# Patient Record
Sex: MALE | Race: BLACK OR AFRICAN AMERICAN | Employment: PART TIME | URBAN - METROPOLITAN AREA
[De-identification: names, ages, dates, MRNs, and addresses within clinical notes are randomized per-mention and may not be internally consistent; named-entity substitution may affect disease eponyms.]

---

## 2017-03-23 ENCOUNTER — OFFICE VISIT (OUTPATIENT)
Dept: ORTHOPEDIC SURGERY | Age: 47
End: 2017-03-23

## 2017-03-23 VITALS
BODY MASS INDEX: 42.66 KG/M2 | HEIGHT: 72 IN | HEART RATE: 98 BPM | SYSTOLIC BLOOD PRESSURE: 138 MMHG | WEIGHT: 315 LBS | DIASTOLIC BLOOD PRESSURE: 94 MMHG

## 2017-03-23 DIAGNOSIS — M17.11 PATELLOFEMORAL ARTHRITIS OF RIGHT KNEE: ICD-10-CM

## 2017-03-23 DIAGNOSIS — M25.561 RIGHT KNEE PAIN, UNSPECIFIED CHRONICITY: ICD-10-CM

## 2017-03-23 DIAGNOSIS — M22.2X2 PATELLOFEMORAL SYNDROME OF LEFT KNEE: Primary | ICD-10-CM

## 2017-03-23 DIAGNOSIS — M25.462 KNEE EFFUSION, LEFT: ICD-10-CM

## 2017-03-23 PROCEDURE — 20610 DRAIN/INJ JOINT/BURSA W/O US: CPT | Performed by: ORTHOPAEDIC SURGERY

## 2017-03-23 PROCEDURE — 99214 OFFICE O/P EST MOD 30 MIN: CPT | Performed by: ORTHOPAEDIC SURGERY

## 2017-03-23 RX ORDER — AZELASTINE 1 MG/ML
1 SPRAY, METERED NASAL 2 TIMES DAILY
COMMUNITY

## 2017-06-08 ENCOUNTER — OFFICE VISIT (OUTPATIENT)
Dept: ORTHOPEDIC SURGERY | Age: 47
End: 2017-06-08

## 2017-06-08 VITALS
BODY MASS INDEX: 42.66 KG/M2 | DIASTOLIC BLOOD PRESSURE: 96 MMHG | SYSTOLIC BLOOD PRESSURE: 138 MMHG | HEIGHT: 72 IN | WEIGHT: 315 LBS | HEART RATE: 86 BPM

## 2017-06-08 DIAGNOSIS — M22.2X2 PATELLOFEMORAL SYNDROME OF LEFT KNEE: Primary | ICD-10-CM

## 2017-06-08 PROCEDURE — 20610 DRAIN/INJ JOINT/BURSA W/O US: CPT | Performed by: ORTHOPAEDIC SURGERY

## 2017-06-08 PROCEDURE — 99213 OFFICE O/P EST LOW 20 MIN: CPT | Performed by: ORTHOPAEDIC SURGERY

## 2017-06-16 ENCOUNTER — TELEPHONE (OUTPATIENT)
Dept: BARIATRICS/WEIGHT MGMT | Age: 47
End: 2017-06-16

## 2017-08-17 ENCOUNTER — TELEPHONE (OUTPATIENT)
Dept: BARIATRICS/WEIGHT MGMT | Age: 47
End: 2017-08-17

## 2017-08-31 ENCOUNTER — OFFICE VISIT (OUTPATIENT)
Dept: BARIATRICS/WEIGHT MGMT | Age: 47
End: 2017-08-31

## 2017-08-31 VITALS
HEART RATE: 98 BPM | HEIGHT: 72 IN | DIASTOLIC BLOOD PRESSURE: 88 MMHG | WEIGHT: 315 LBS | SYSTOLIC BLOOD PRESSURE: 137 MMHG | RESPIRATION RATE: 16 BRPM | BODY MASS INDEX: 42.66 KG/M2

## 2017-08-31 DIAGNOSIS — M17.11 PATELLOFEMORAL ARTHRITIS OF RIGHT KNEE: ICD-10-CM

## 2017-08-31 DIAGNOSIS — Z01.818 PRE-OPERATIVE CLEARANCE: ICD-10-CM

## 2017-08-31 DIAGNOSIS — G47.33 OBSTRUCTIVE SLEEP APNEA: ICD-10-CM

## 2017-08-31 DIAGNOSIS — E66.01 MORBID OBESITY WITH BMI OF 40.0-44.9, ADULT (HCC): Primary | ICD-10-CM

## 2017-08-31 PROCEDURE — 99205 OFFICE O/P NEW HI 60 MIN: CPT | Performed by: SURGERY

## 2017-10-10 ENCOUNTER — OFFICE VISIT (OUTPATIENT)
Dept: ORTHOPEDIC SURGERY | Age: 47
End: 2017-10-10

## 2017-10-10 VITALS
DIASTOLIC BLOOD PRESSURE: 92 MMHG | BODY MASS INDEX: 42.66 KG/M2 | WEIGHT: 315 LBS | HEART RATE: 81 BPM | HEIGHT: 72 IN | SYSTOLIC BLOOD PRESSURE: 137 MMHG

## 2017-10-10 DIAGNOSIS — M22.2X2 PATELLOFEMORAL SYNDROME OF LEFT KNEE: Primary | ICD-10-CM

## 2017-10-10 PROCEDURE — 99213 OFFICE O/P EST LOW 20 MIN: CPT | Performed by: ORTHOPAEDIC SURGERY

## 2017-10-10 PROCEDURE — 20610 DRAIN/INJ JOINT/BURSA W/O US: CPT | Performed by: ORTHOPAEDIC SURGERY

## 2017-10-10 NOTE — PROGRESS NOTES
Review of Systems   Musculoskeletal: Positive for joint pain. Psychiatric/Behavioral: Positive for depression. All other systems reviewed and are negative.

## 2017-10-10 NOTE — PROGRESS NOTES
Chief Complaint    Follow-up (left knee pain)      History of Present Illness:  Remigio Orr is a 41 y.o. male  Pain Assessment  Location of Pain: Knee  Location Modifiers: Left  Severity of Pain: 2  Quality of Pain: Dull  Duration of Pain: A few days  Aggravating Factors: Bending  Relieving Factors: Rest  Result of Injury: No  Work-Related Injury: No  Are there other pain locations you wish to document?: No     Patient is being seen for a follow up visit for persistent left knee pain and swelling. Patient has been prescribed conservative treatment measures for left knee patellofemoral syndrome, which has consisted of multiple knee injections and aspirations, weight loss management. Patient has recently enrolled in [facility]'s bariatric weight loss program and his weight is currently at 330#. Patient last had his left knee aspirated and cortisone injection administered 6/8/17 and recalls receiving excellent relief of symptoms. Patient has noticed a return of his pre-injection symptoms, with anterior knee pain and stiffness and noticed a return of swelling about his left knee over the past several weeks. Medical History:  Patient's medications, allergies, past medical, surgical, social and family histories were reviewed and updated as appropriate. Allergies   Allergen Reactions    Other Other (See Comments)     cats     Current Outpatient Prescriptions on File Prior to Visit   Medication Sig Dispense Refill    naproxen-diphenhydramine 220-25 MG TABS Take by mouth      azelastine (ASTELIN) 0.1 % nasal spray 1 spray by Nasal route 2 times daily Use in each nostril as directed      Fluticasone Propionate (FLONASE ALLERGY RELIEF NA) by Nasal route       No current facility-administered medications on file prior to visit.             Review of Systems:  Relevant review of systems reviewed and available in the patient's chart    Vital Signs:  Vitals:    10/10/17 1026   BP: (!) 137/92   Pulse: 81 Physical Exam ______  Constitutional:  oriented to person, place, and time,  appears well-developed and well-nourished. ________________________________  Musculoskeletal:        Right hip: exhibits normal range of motion, normal strength, no tenderness, no bony tenderness, no swelling, no crepitus and no deformity. Left hip:  exhibits normal range of motion, normal strength, no tenderness, no swelling, no crepitus and no deformity. Right ankle: exhibits normal range of motion, no swelling, no ecchymosis and no deformity. No tenderness. Left ankle:  exhibits normal range of motion, no swelling, no ecchymosis and no deformity. No tenderness. ________  Neurological:  is alert and oriented to person, place, and time. She has normal strength and normal reflexes. She displays a negative Romberg sign. Gait normal. ____  Skin: No ecchymosis, no laceration, no lesion and no rash noted. No erythema. ____  Psychiatric:  has a normal mood and affect. Her speech is normal. Cognition and memory are normal. __    Knee Examination: Left Knee    Inspection:  Moderate joint effusion    Palpation:  No joint line tenderness, no crepitus    Range of Motion:  5 - 125 with some discomfort    Strength:  Good    Skin: There are no rashes, ulcerations or lesions. Impression:  Encounter Diagnosis   Name Primary?  Patellofemoral syndrome of left knee Yes       Treatment Plan: The patient was not scheduled for a cortisone injection, but after examination and evaluation. Following treatment plan is offered. Patient should continue to follow conservative treatment measures for left knee PFP syndrome, which consists of a repeat aspiration and cortisone injection. Patient was in agreement with this recommendation and wanted to proceed with the procedure. After informed consent was provided, the patient lay supine on the exam table. The superolateral aspect of the left knee was prepped with Chlora-prep.   The skin and subcutaneous tissues were anesthetized with ethyl chloride spray and 1% lidocaine injected with a 20-gauge needle. The needle was withdrawn. Through the same tract, an 18-gauge needle attached to a 50-mL syringe was inserted into the left knee. Then, 45 mL of yellowish, normal appearing fluid was aspirated. The syringe was removed and through the needle,  2 ml of 40mg/ml DepoMedrol was injected. The needle was withdrawn and the puncture site sealed with a Band-Aid. The patient tolerated the procedure well. Patient should follow through on his consultation and preparations with bariatric surgeons       Follow up: PRN    This dictation was performed with a verbal recognition program (DRAGON) and it was checked for errors. It is possible that there are still dictated errors within this office note. If so, please bring any errors to my attention for an addendum. All efforts were made to ensure that this office note is accurate. Supervising Physician Attestation:  I, Dr. Elton Sanders, personally performed the services described in this documentation as scribed above, and it is both accurate and complete and I agree with all pertinent clinical information. I personally interviewed the patient and performed a physical examination and medical decision making. I discussed the patient's condition and treatment options and have  reviewed and agree with the past medical, family and social history unless otherwise noted. All of the patient's questions were answered.       Board Certified Orthopaedic Surgeon  [address] [facility] and [facility] and [address] and [employer]  Professor of [facility] [address], [place], ATC, am scribing for and in the presence of Dr. Sylvia Cook     10:55 AM    Nydia Cevallos MS, ATC

## 2017-10-12 ENCOUNTER — OFFICE VISIT (OUTPATIENT)
Dept: BARIATRICS/WEIGHT MGMT | Age: 47
End: 2017-10-12

## 2017-10-12 VITALS
HEIGHT: 72 IN | SYSTOLIC BLOOD PRESSURE: 140 MMHG | HEART RATE: 78 BPM | DIASTOLIC BLOOD PRESSURE: 90 MMHG | BODY MASS INDEX: 42.66 KG/M2 | WEIGHT: 315 LBS

## 2017-10-12 DIAGNOSIS — G47.33 OBSTRUCTIVE SLEEP APNEA: ICD-10-CM

## 2017-10-12 DIAGNOSIS — M17.11 PATELLOFEMORAL ARTHRITIS OF RIGHT KNEE: ICD-10-CM

## 2017-10-12 DIAGNOSIS — E66.01 MORBID OBESITY WITH BMI OF 40.0-44.9, ADULT (HCC): ICD-10-CM

## 2017-10-12 DIAGNOSIS — Z01.818 PRE-OPERATIVE CLEARANCE: ICD-10-CM

## 2017-10-12 DIAGNOSIS — E66.01 MORBID OBESITY WITH BMI OF 45.0-49.9, ADULT (HCC): Primary | ICD-10-CM

## 2017-10-12 LAB
A/G RATIO: 1.3 (ref 1.1–2.2)
ALBUMIN SERPL-MCNC: 3.9 G/DL (ref 3.4–5)
ALP BLD-CCNC: 85 U/L (ref 40–129)
ALT SERPL-CCNC: 39 U/L (ref 10–40)
ANION GAP SERPL CALCULATED.3IONS-SCNC: 13 MMOL/L (ref 3–16)
AST SERPL-CCNC: 23 U/L (ref 15–37)
BANDED NEUTROPHILS RELATIVE PERCENT: 1 % (ref 0–7)
BASOPHILS ABSOLUTE: 0 K/UL (ref 0–0.2)
BASOPHILS RELATIVE PERCENT: 0 %
BILIRUB SERPL-MCNC: <0.2 MG/DL (ref 0–1)
BUN BLDV-MCNC: 13 MG/DL (ref 7–20)
CALCIUM SERPL-MCNC: 9.1 MG/DL (ref 8.3–10.6)
CHLORIDE BLD-SCNC: 102 MMOL/L (ref 99–110)
CHOLESTEROL, TOTAL: 195 MG/DL (ref 0–199)
CO2: 26 MMOL/L (ref 21–32)
CREAT SERPL-MCNC: 1 MG/DL (ref 0.9–1.3)
EOSINOPHILS ABSOLUTE: 0 K/UL (ref 0–0.6)
EOSINOPHILS RELATIVE PERCENT: 0 %
FOLATE: 8.95 NG/ML (ref 4.78–24.2)
GFR AFRICAN AMERICAN: >60
GFR NON-AFRICAN AMERICAN: >60
GLOBULIN: 3 G/DL
GLUCOSE BLD-MCNC: 88 MG/DL (ref 70–99)
HCT VFR BLD CALC: 47 % (ref 40.5–52.5)
HDLC SERPL-MCNC: 59 MG/DL (ref 40–60)
HEMOGLOBIN: 15.8 G/DL (ref 13.5–17.5)
IRON SATURATION: 17 % (ref 20–50)
IRON: 56 UG/DL (ref 59–158)
LDL CHOLESTEROL CALCULATED: 114 MG/DL
LYMPHOCYTES ABSOLUTE: 3.1 K/UL (ref 1–5.1)
LYMPHOCYTES RELATIVE PERCENT: 29 %
MCH RBC QN AUTO: 32.5 PG (ref 26–34)
MCHC RBC AUTO-ENTMCNC: 33.5 G/DL (ref 31–36)
MCV RBC AUTO: 97 FL (ref 80–100)
MONOCYTES ABSOLUTE: 0.6 K/UL (ref 0–1.3)
MONOCYTES RELATIVE PERCENT: 6 %
NEUTROPHILS ABSOLUTE: 7 K/UL (ref 1.7–7.7)
NEUTROPHILS RELATIVE PERCENT: 64 %
PDW BLD-RTO: 14.7 % (ref 12.4–15.4)
PLATELET # BLD: 203 K/UL (ref 135–450)
PMV BLD AUTO: 9.9 FL (ref 5–10.5)
POTASSIUM SERPL-SCNC: 4.6 MMOL/L (ref 3.5–5.1)
RBC # BLD: 4.85 M/UL (ref 4.2–5.9)
SLIDE REVIEW: NORMAL
SODIUM BLD-SCNC: 141 MMOL/L (ref 136–145)
TOTAL IRON BINDING CAPACITY: 326 UG/DL (ref 260–445)
TOTAL PROTEIN: 6.9 G/DL (ref 6.4–8.2)
TRIGL SERPL-MCNC: 108 MG/DL (ref 0–150)
TSH REFLEX: 1.53 UIU/ML (ref 0.27–4.2)
VITAMIN B-12: 714 PG/ML (ref 211–911)
VITAMIN D 25-HYDROXY: 25.7 NG/ML
VLDLC SERPL CALC-MCNC: 22 MG/DL
WBC # BLD: 10.8 K/UL (ref 4–11)

## 2017-10-12 PROCEDURE — 99213 OFFICE O/P EST LOW 20 MIN: CPT | Performed by: NURSE PRACTITIONER

## 2017-10-12 ASSESSMENT — ENCOUNTER SYMPTOMS
RESPIRATORY NEGATIVE: 1
GASTROINTESTINAL NEGATIVE: 1

## 2017-10-12 NOTE — PROGRESS NOTES
Maryana Camachoy gained 1 lbs over past . Reports he has been under a lot of stress. He is eating 1- 2 x day. He takes care of his mom 4-5 x week and reports he will house sit in the next 2 weeks. Breakfast: coffee     Snack: 6 crackers with cheese and almonds     Lunch:     Snack: (2:00) if at home- protein (chicken with salad) or out (burger) or 6 crackers with cheese and almonds     Dinner:     Snack: (10:00) - sandwich with veggies (greens/tomatoes) with protein/cheese     Reports he has tried to reduce late night cravings     Fluids: coffee (caffeinated) with baileys (non alcoholic creamer), water, occasionally has red bull/energy drinks, kombucha     Is pt consuming smaller portions? yes    Is pt consuming at least 64 oz of fluids per day? yes    Is pt consuming carbonated, caffeinated, or sugary beverages? yes    Has pt sampled Unjury and/or Nectar protein?  Not yet     Exercise: needs to have knee replacement- has arthritis in his L knee- hopes this will improve     Plan/Recommendations: Eat 3 x day, meal plan, switch to decaf, avoid red bull/energy drinks     Handouts: none     Tayler Avila

## 2017-10-12 NOTE — PROGRESS NOTES
[address]  Physicians   [facility]    Subjective:      Patient ID: Mely Scale is a 41 y.o. male    HPI    Presurgery    [place] Scale is a very pleasant 41 y.o. male with Body mass index is 45 kg/m². Lorenso Frankel Past Medical History:   Diagnosis Date    Arthritis      Past Surgical History:   Procedure Laterality Date    APPENDECTOMY       Family History   Problem Relation Age of Onset    Arthritis Mother     Other Mother      MS    Cancer Father      pancreas    Cancer Maternal Grandmother      colon     Arthritis Maternal Grandmother      Social History   Substance Use Topics    Smoking status: Current Every Day Smoker     Packs/day: 1.00     Years: 15.00    Smokeless tobacco: Never Used      Comment: 1 pack cigs every 2 days    Alcohol use Yes      Comment: 1 bottle wine a week     I counseled the patient on the importance of not smoking and risks of ETOH. Allergies   Allergen Reactions    Other Other (See Comments)     cats     Vitals:    10/12/17 1305   BP: (!) 142/96   Pulse: 77   Weight: (!) 331 lb 12.8 oz (150.5 kg)   Height: 6' (1.829 m)        Body mass index is 45 kg/m². Current Outpatient Prescriptions:     naproxen-diphenhydramine 220-25 MG TABS, Take by mouth, Disp: , Rfl:     azelastine (ASTELIN) 0.1 % nasal spray, 1 spray by Nasal route 2 times daily Use in each nostril as directed, Disp: , Rfl:     Fluticasone Propionate (FLONASE ALLERGY RELIEF NA), by Nasal route, Disp: , Rfl:       Review of Systems   Constitutional: Negative. HENT: Negative. Respiratory: Negative. Cardiovascular: Negative. Gastrointestinal: Negative. Endocrine: Negative. Genitourinary: Negative. Musculoskeletal:        Knee pain   Skin: Negative. Neurological: Negative. Psychiatric/Behavioral: Negative. Objective:   Physical Exam   Constitutional: He is oriented to person, place, and time. He appears well-developed and well-nourished.    HENT:   Head: Normocephalic and atraumatic. Cardiovascular: Normal rate. Pulmonary/Chest: Effort normal.   Neurological: He is alert and oriented to person, place, and time. Skin: Skin is warm and dry. Psychiatric: He has a normal mood and affect. His behavior is normal. Judgment and thought content normal.         Assessment and Plan:       Patient is here for their 2nd  presurgery visit for sleeve, gained 1 lbs. The patient's current Body mass index is 45 kg/m². (10/12/17). He  is making some dietary and behavior modifications. He will work on eating 3x/day and his liquid calories. He did meet with the registered dietitian for continued follow up. I agree with recommendations and plan. He is not exercising due to knee pain, encouraged physical activity as he is able. Discussed preop work up which he is working on. We discussed working on smoking cessation cessation, marijuana cessation and avoiding alcohol. We will see him back in 1 month for continued follow up. I have spent 15 min counseling patient.

## 2017-10-13 ENCOUNTER — TELEPHONE (OUTPATIENT)
Dept: BARIATRICS/WEIGHT MGMT | Age: 47
End: 2017-10-13

## 2017-10-13 DIAGNOSIS — R73.03 PREDIABETES: ICD-10-CM

## 2017-10-13 DIAGNOSIS — E61.1 IRON DEFICIENCY: ICD-10-CM

## 2017-10-13 DIAGNOSIS — E78.2 MIXED HYPERLIPIDEMIA: Primary | ICD-10-CM

## 2017-10-13 DIAGNOSIS — E55.9 VITAMIN D DEFICIENCY: ICD-10-CM

## 2017-10-13 LAB
ESTIMATED AVERAGE GLUCOSE: 122.6 MG/DL
HBA1C MFR BLD: 5.9 %

## 2017-10-13 RX ORDER — FERROUS SULFATE 325(65) MG
325 TABLET ORAL
Qty: 90 TABLET | Refills: 0 | Status: SHIPPED | OUTPATIENT
Start: 2017-10-13 | End: 2019-04-17 | Stop reason: ALTCHOICE

## 2017-10-13 RX ORDER — CHOLECALCIFEROL (VITAMIN D3) 50 MCG
2000 TABLET ORAL DAILY
Qty: 90 TABLET | Refills: 0 | Status: SHIPPED | OUTPATIENT
Start: 2017-10-13 | End: 2020-02-11 | Stop reason: CLARIF

## 2017-11-16 ENCOUNTER — OFFICE VISIT (OUTPATIENT)
Dept: BARIATRICS/WEIGHT MGMT | Age: 47
End: 2017-11-16

## 2017-11-16 VITALS
DIASTOLIC BLOOD PRESSURE: 87 MMHG | HEART RATE: 96 BPM | WEIGHT: 315 LBS | SYSTOLIC BLOOD PRESSURE: 136 MMHG | HEIGHT: 72 IN | BODY MASS INDEX: 42.66 KG/M2

## 2017-11-16 DIAGNOSIS — G47.33 OBSTRUCTIVE SLEEP APNEA: ICD-10-CM

## 2017-11-16 DIAGNOSIS — E55.9 VITAMIN D DEFICIENCY: ICD-10-CM

## 2017-11-16 DIAGNOSIS — E66.01 MORBID OBESITY WITH BMI OF 40.0-44.9, ADULT (HCC): Primary | ICD-10-CM

## 2017-11-16 DIAGNOSIS — R73.03 PREDIABETES: ICD-10-CM

## 2017-11-16 DIAGNOSIS — E61.1 IRON DEFICIENCY: ICD-10-CM

## 2017-11-16 DIAGNOSIS — E78.2 MIXED HYPERLIPIDEMIA: ICD-10-CM

## 2017-11-16 PROCEDURE — 4004F PT TOBACCO SCREEN RCVD TLK: CPT | Performed by: NURSE PRACTITIONER

## 2017-11-16 PROCEDURE — G8484 FLU IMMUNIZE NO ADMIN: HCPCS | Performed by: NURSE PRACTITIONER

## 2017-11-16 PROCEDURE — 99213 OFFICE O/P EST LOW 20 MIN: CPT | Performed by: NURSE PRACTITIONER

## 2017-11-16 PROCEDURE — G8417 CALC BMI ABV UP PARAM F/U: HCPCS | Performed by: NURSE PRACTITIONER

## 2017-11-16 PROCEDURE — G8427 DOCREV CUR MEDS BY ELIG CLIN: HCPCS | Performed by: NURSE PRACTITIONER

## 2017-11-16 ASSESSMENT — ENCOUNTER SYMPTOMS
RESPIRATORY NEGATIVE: 1
GASTROINTESTINAL NEGATIVE: 1

## 2017-11-16 NOTE — PROGRESS NOTES
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 Reports he has had some cheat days. Reports he has been dealing with a lot of stress taking care of his mother with MS more. Stated he had meal prepped some days. Breakfast: decaf coffee- not hungry in the morning     Snack: no     Lunch: (2 p.m.) salmon with soy sauce, or quarter pounder with fries and medium diet drink     Snack: no    Dinner: (10) fast food- burger/fries with diet coke, or cucumber rolls with salmon     Snack: no    Fluids: Water with juice, now only drinking decaf coffee, no longer drinking red bull/energy drinks, bolthouse juice, some diet coke     Is pt consuming smaller portions? this varies    Is pt consuming at least 64 oz of fluids per day? yes    Is pt consuming carbonated, caffeinated, or sugary beverages? yes    Has pt sampled Unjury and/or Nectar protein? Not yet     Exercise: not as much d/t taking care of his mother.      Plan/Recommendations: eliminate diet coke, choose healthier food options, eat at least 3-4 x day, avoid juice     Handouts: none     Claude Carrizales

## 2017-11-16 NOTE — PATIENT INSTRUCTIONS
Patient received dietary handouts and education. Goals in preparing for bariatric surgery    You should be giving up all beverages that have carbonation, sugar, and caffeine. (Refer to the approved liquids list provided at initial visit)   You should be drinking 64 ounces of calorie free fluids per day. Suggestions include:  o Water (you may add fresh lemon or lime)  o Crystal Light  o [place] Liquid Water Enhancer  o Propel Zero  o Powerade Zero  o Isopure  o Pzogn1K  o SOBE Lifewater Zero  o Vitamin Water Zero  o Sugar Free Darshan-Aid      You should be eating 4-6 times per day.  Three small meals plus 1-2 snacks per day is your goal. This balances your calories and nutrients evenly throughout the day and helps to boost your metabolism. Snacking is a good thing if you are choosing healthful options. Refer to the snack list provided at your initial visit. Aim for a protein at every snack, plus a fruit, vegetable or starch. You should be eating protein at every meal and snack.  Protein is typically found in animal sources, i.e. chicken, beef, pork, fish and seafood, eggs. It is also found in low-fat dairy sources such as skim or 1% milk, low-fat yogurt, low-fat cheese, and low-fat cottage cheese. Plant based sources of protein include peanut butter, beans, and soy. You should be utilizing the 9-inch plate method.  Eating on a smaller plate will help you control portion size. But what you put on your plate counts also. Make ¼ of your plate protein, ¼ starch and ½ the plate non-starchy vegetables. You should be eliminating caffeine.  Caffeine is dehydrating. After surgery, its very important to stay hydrated. Giving up caffeine before surgery will help you focus on the changes necessary to be successful after surgery. There are many decaffeinated coffee and tea products available in grocery stores. You should be reducing added fat and sugar in your diet.    Frying foods adds too much fat and calories. Baking, broiling, or grilling meats add flavor without unhealthy fats. Using cooking oil spray and spray butter products are also healthful changes that will aid in your weight loss. Foods high in sugar are often also high in calories and low in nutrients. Eating habits after surgery will have to be a permanent and long-term change. Eating habits are so ingrained that it can be difficult to change. It is important to practice new eating habits prior to surgery to mentally prepare yourself for the challenge ahead. Also remember that overall health, age, and genetics make each persons weight loss progress different. Do not compare your progress (pre or post-operatively), the amount you eat, or exercise to other patients.

## 2017-11-16 NOTE — PROGRESS NOTES
[facility]) Physicians   [facility]    Subjective:      Patient ID: Destiney Atwood is a 41 y.o. male    HPI    Presurgery    Destiney Atwood is a very pleasant 41 y.o. male with Body mass index is 44.62 kg/m². Livier Bowen Past Medical History:   Diagnosis Date    Arthritis      Past Surgical History:   Procedure Laterality Date    APPENDECTOMY       Family History   Problem Relation Age of Onset    Arthritis Mother     Other Mother      MS    Cancer Father      pancreas    Cancer Maternal Grandmother      colon     Arthritis Maternal Grandmother      Social History   Substance Use Topics    Smoking status: Current Every Day Smoker     Packs/day: 1.00     Years: 15.00    Smokeless tobacco: Never Used    Alcohol use Yes      Comment: 1 bottle wine a week     I counseled the patient on the importance of not smoking and risks of ETOH. Allergies   Allergen Reactions    Other Other (See Comments)     cats     Vitals:    11/16/17 1158   BP: 136/87   Pulse: 96   Weight: (!) 329 lb (149.2 kg)   Height: 6' (1.829 m)        Body mass index is 44.62 kg/m². Current Outpatient Prescriptions:     ferrous sulfate 325 (65 Fe) MG tablet, Take 1 tablet by mouth Daily with supper, Disp: 90 tablet, Rfl: 0    Cholecalciferol (VITAMIN D) 2000 units TABS tablet, Take 1 tablet by mouth daily, Disp: 90 tablet, Rfl: 0    naproxen-diphenhydramine 220-25 MG TABS, Take by mouth, Disp: , Rfl:     azelastine (ASTELIN) 0.1 % nasal spray, 1 spray by Nasal route 2 times daily Use in each nostril as directed, Disp: , Rfl:     Fluticasone Propionate (FLONASE ALLERGY RELIEF NA), by Nasal route, Disp: , Rfl:       Review of Systems   Constitutional: Negative. HENT: Negative. Respiratory: Negative. Cardiovascular: Negative. Gastrointestinal: Negative. Endocrine: Negative. Genitourinary: Negative. Skin: Negative. Neurological: Negative. Psychiatric/Behavioral: Negative.           Objective:

## 2018-01-15 ENCOUNTER — OFFICE VISIT (OUTPATIENT)
Dept: BARIATRICS/WEIGHT MGMT | Age: 48
End: 2018-01-15

## 2018-01-15 VITALS
WEIGHT: 315 LBS | DIASTOLIC BLOOD PRESSURE: 88 MMHG | BODY MASS INDEX: 42.66 KG/M2 | HEART RATE: 76 BPM | HEIGHT: 72 IN | SYSTOLIC BLOOD PRESSURE: 142 MMHG

## 2018-01-15 DIAGNOSIS — R03.0 ELEVATED BP WITHOUT DIAGNOSIS OF HYPERTENSION: ICD-10-CM

## 2018-01-15 DIAGNOSIS — Z72.0 TOBACCO USE: ICD-10-CM

## 2018-01-15 DIAGNOSIS — E66.01 MORBID OBESITY WITH BMI OF 40.0-44.9, ADULT (HCC): Primary | ICD-10-CM

## 2018-01-15 DIAGNOSIS — Z79.899 HIGH RISK MEDICATIONS (NOT ANTICOAGULANTS) LONG-TERM USE: ICD-10-CM

## 2018-01-15 DIAGNOSIS — R73.03 PREDIABETES: ICD-10-CM

## 2018-01-15 PROCEDURE — 99215 OFFICE O/P EST HI 40 MIN: CPT | Performed by: FAMILY MEDICINE

## 2018-01-15 PROCEDURE — G8484 FLU IMMUNIZE NO ADMIN: HCPCS | Performed by: FAMILY MEDICINE

## 2018-01-15 PROCEDURE — G8427 DOCREV CUR MEDS BY ELIG CLIN: HCPCS | Performed by: FAMILY MEDICINE

## 2018-01-15 PROCEDURE — G8417 CALC BMI ABV UP PARAM F/U: HCPCS | Performed by: FAMILY MEDICINE

## 2018-01-15 PROCEDURE — 93000 ELECTROCARDIOGRAM COMPLETE: CPT | Performed by: FAMILY MEDICINE

## 2018-01-15 PROCEDURE — 4004F PT TOBACCO SCREEN RCVD TLK: CPT | Performed by: FAMILY MEDICINE

## 2018-01-15 ASSESSMENT — ENCOUNTER SYMPTOMS
EYES NEGATIVE: 1
RESPIRATORY NEGATIVE: 1
GASTROINTESTINAL NEGATIVE: 1

## 2018-01-15 ASSESSMENT — PATIENT HEALTH QUESTIONNAIRE - PHQ9
SUM OF ALL RESPONSES TO PHQ9 QUESTIONS 1 & 2: 0
1. LITTLE INTEREST OR PLEASURE IN DOING THINGS: 0
SUM OF ALL RESPONSES TO PHQ QUESTIONS 1-9: 0
2. FEELING DOWN, DEPRESSED OR HOPELESS: 0

## 2018-01-15 NOTE — PATIENT INSTRUCTIONS
Patient Education        Learning About Obesity  What is obesity? Obesity means having so much body fat that your health is in danger. Having too much body fat can lead to type 2 diabetes, heart disease, high blood pressure, arthritis, sleep apnea, and stroke. Even if you don't feel bad now, think about these health risks. Do they seem like a good reason to start on a new path toward a healthier weight? Or do you have another personal, powerful reason for wanting to lose weight? Whatever it is, keep it in mind. It can be hard to change eating habits and exercise habits. But with your own reason and plan, you can do it. How do you know if your weight is in the obesity range? To know if your weight is in the obesity range, your doctor looks at your body mass index (BMI) and waist size. Your BMI is a number that is calculated from your weight and your height. To figure your BMI for yourself, get a BMI table from your doctor or use an online tool, such as http://www.Eventful.com/ on the [employer] of [employer]. What causes obesity? When you take in more calories than you burn off, you gain weight. How you eat, how active you are, and other things affect how your body uses calories and whether you gain weight. If you have family members who have too much body fat, you may have inherited a tendency to gain weight. And your family also helps form your eating and lifestyle habits, which can lead to obesity. Also, our busy lives make it harder to plan and cook healthy meals. For many of us, it's easier to reach for prepared foods, go out to eat, or go to the drive-through. But these foods are often high in saturated fat and calories. Portions are often too large. What can you do to reach a healthy weight? Focus on health, not diets. Diets are hard to stay on and don't work in the long run.  It is very hard to stay with a diet that includes lots of big changes in your eating habits. Instead of a diet, focus on lifestyle changes that will improve your health and achieve the right balance of energy and calories. To lose weight, you need to burn more calories than you take in. You can do it by eating healthy foods in reasonable amounts and becoming more active, even a little bit every day. Making small changes over time can add up to a lot. Make a plan for change. Many people have found that naming their reasons for change and staying focused on their plan can make a big difference. Work with your doctor to create a plan that is right for you. · Ask yourself: Mely Carranza are my personal, most powerful reasons for wanting this change? What will my life look like when I've made the change? \"  · Set your long-term goal. Make it specific, such as \"I will lose x pounds. \"  · Break your long-term goal into smaller, short-term goals. Make these small steps specific and within your reach, things you know you can do. These steps are what keep you going from day to day. How can you stay on your plan for change? Be ready. Choose to start during a time when there are few events that might trigger slip-ups, like holidays, social events, and high-stress periods. Decide on your first few steps. Most people have more success when they make small changes, one step at a time. For example, you might switch a daily candy bar to a piece of fruit, walk 10 minutes more, or add more vegetables to a meal.  Line up your support people. Make sure you're not going to be alone as you make this change. Connect with people who understand how important it is to you. Ask family members and friends for help in keeping with your plan. And think about who could make it harder for you, and how to handle them. Try tracking. People who keep track of what they eat, feel, and do are better at losing weight. Try writing down things like:  · What and how much you eat.   · How you feel before and after each

## 2018-01-15 NOTE — PROGRESS NOTES
10/12/2017 29.0  % Final    Monocytes % 10/12/2017 6.0  % Final    Eosinophils % 10/12/2017 0.0  % Final    Basophils % 10/12/2017 0.0  % Final    Neutrophils # 10/12/2017 7.0  1.7 - 7.7 K/uL Final    Lymphocytes # 10/12/2017 3.1  1.0 - 5.1 K/uL Final    Monocytes # 10/12/2017 0.6  0.0 - 1.3 K/uL Final    Eosinophils # 10/12/2017 0.0  0.0 - 0.6 K/uL Final    Basophils # 10/12/2017 0.0  0.0 - 0.2 K/uL Final    Bands Relative 10/12/2017 1  0 - 7 % Final         Assessment and Plan:      ICD-10-CM ICD-9-CM    1. Morbid obesity with BMI of 40.0-44.9, adult ([facility]) E66.01 278.01 EKG 12 Lead- Sinus rhythm with rate variation     Heavily counseled on the importance of therapeutic lifestyle changes through diet and exercise. The patient understands that the goal of treatment is to reach and stay at a healthy weight. The initial treatment goal is to lose at least 5-10% of his body weight in 12 weeks. This will require changes in eating habits, increased physical activity, and behavior changes. Counseled on low carb diet. Start 1500-Cristo/low carb meal plan. Patient handouts and education material provided and reviewed in detail with the patient. All questions answered. Encouraged patient to keep a food journal and to bring it to his next visit. Discussed available treatment options in addition to lifestyle changes including medications or meal replacements (none of which are covered by his insurance). He would like to focus on lifetyle modification for now. May consider OPTIFAST down the line if needed. I advised him to quit smoking and stop marijuana use. Follow-up in 4 weeks. Z68.41 V85.41    2. High risk medications (not anticoagulants) long-term use Z79.899 V58.69 EKG 12 Lead   3. Prediabetes R73.03 790.29 Reinforced low carb diet and exercise. 4. Elevated BP without diagnosis of hypertension R03.0 796.2 F/u with PCP.            Nutrition:  [] LCHF/Ketogenic [x] Low carb/low-calorie diet [] Low-calorie diet []Maintenance        FITTE:   [x] Cardio [x] Resistance/stength exercises   [x] ACSM recommendations (150 minutes/week)    [] Prevention of weight gain (150-250 minutes/week)        Behavior:   [x] Motivational interviewing performed    [] Referral for counseling  [x] Discussed strategies to overcome habits/challenges for focus      [] Stress management   [x] Stimulus control  [] Sleep hygiene      Orders Placed This Encounter   Procedures    EKG 12 Lead     Order Specific Question:   Reason for Exam?     Answer: Other       No Follow-up on file. Greater than 50% of this 45 minute visit was used in direct counseling. This dictation was performed with a verbal recognition program (Dragon) and all efforts were made to ensure accuracy of this dictation. It is possible that there are still dictated errors within this note. If so, please bring any errors to my attention for correction.

## 2018-02-15 ENCOUNTER — OFFICE VISIT (OUTPATIENT)
Dept: BARIATRICS/WEIGHT MGMT | Age: 48
End: 2018-02-15

## 2018-02-15 VITALS
HEIGHT: 72 IN | BODY MASS INDEX: 42.66 KG/M2 | WEIGHT: 315 LBS | SYSTOLIC BLOOD PRESSURE: 130 MMHG | HEART RATE: 76 BPM | DIASTOLIC BLOOD PRESSURE: 86 MMHG

## 2018-02-15 DIAGNOSIS — Z71.3 DIETARY COUNSELING AND SURVEILLANCE: ICD-10-CM

## 2018-02-15 DIAGNOSIS — E66.01 MORBID OBESITY WITH BMI OF 40.0-44.9, ADULT (HCC): Primary | ICD-10-CM

## 2018-02-15 PROCEDURE — 99213 OFFICE O/P EST LOW 20 MIN: CPT | Performed by: FAMILY MEDICINE

## 2018-02-15 PROCEDURE — 4004F PT TOBACCO SCREEN RCVD TLK: CPT | Performed by: FAMILY MEDICINE

## 2018-02-15 PROCEDURE — G8484 FLU IMMUNIZE NO ADMIN: HCPCS | Performed by: FAMILY MEDICINE

## 2018-02-15 PROCEDURE — G8417 CALC BMI ABV UP PARAM F/U: HCPCS | Performed by: FAMILY MEDICINE

## 2018-02-15 PROCEDURE — G8427 DOCREV CUR MEDS BY ELIG CLIN: HCPCS | Performed by: FAMILY MEDICINE

## 2018-02-15 ASSESSMENT — ENCOUNTER SYMPTOMS
GASTROINTESTINAL NEGATIVE: 1
RESPIRATORY NEGATIVE: 1
EYES NEGATIVE: 1

## 2018-02-15 NOTE — PROGRESS NOTES
Patient: Isauro Benedict                      Encounter Date: 2/15/2018    YOB: 1970               Age: 41 y.o. Chief Complaint   Patient presents with    Weight Management     2nd MWM, 1500 cristo/ LC       /86   Pulse 76   Ht 6' (1.829 m)   Wt (!) 322 lb 8 oz (146.3 kg)   BMI 43.74 kg/m²     Body mass index is 43.74 kg/m². HPI: 41 y.o. male with a long-standing history of obesity presents today for follow-up. He has lost 8.5 pounds since his last visit on 1/15. Current treatment includes 1500-Cristo/low carb diet and exercise. Met with the dietitian today. Food recall and assessment reviewed. Has made positive dietary changes. Pleased with this weight loss. Continues to be motivated to keep losing weight. Diet: []LCHF/Ketogenic   [x]Modified low-calorie/low carb diet  []Low-calorie diet          []Maintenance       []Other:         Adherent?  [x]Yes     []No       Side effects: No          Exercise: []Cardio     [x]Resistance/strength training- 30-45 minutes/week     []Other: No intentional exercise, but trying to be physically active     Allergies   Allergen Reactions    Other Other (See Comments)     cats         Current Outpatient Prescriptions:     ferrous sulfate 325 (65 Fe) MG tablet, Take 1 tablet by mouth Daily with supper, Disp: 90 tablet, Rfl: 0    Cholecalciferol (VITAMIN D) 2000 units TABS tablet, Take 1 tablet by mouth daily, Disp: 90 tablet, Rfl: 0    naproxen-diphenhydramine 220-25 MG TABS, Take by mouth, Disp: , Rfl:     azelastine (ASTELIN) 0.1 % nasal spray, 1 spray by Nasal route 2 times daily Use in each nostril as directed, Disp: , Rfl:     Fluticasone Propionate (FLONASE ALLERGY RELIEF NA), by Nasal route, Disp: , Rfl:     Patient Active Problem List   Diagnosis    Patellofemoral syndrome of left knee    Knee effusion, left    Flat feet    Left knee pain    Pes planus of both feet    Patellofemoral arthritis of right knee    Right knee pain   

## 2018-02-15 NOTE — PATIENT INSTRUCTIONS
Patient Education        Learning About Obesity  What is obesity? Obesity means having so much body fat that your health is in danger. Having too much body fat can lead to type 2 diabetes, heart disease, high blood pressure, arthritis, sleep apnea, and stroke. Even if you don't feel bad now, think about these health risks. Do they seem like a good reason to start on a new path toward a healthier weight? Or do you have another personal, powerful reason for wanting to lose weight? Whatever it is, keep it in mind. It can be hard to change eating habits and exercise habits. But with your own reason and plan, you can do it. How do you know if your weight is in the obesity range? To know if your weight is in the obesity range, your doctor looks at your body mass index (BMI) and waist size. Your BMI is a number that is calculated from your weight and your height. To figure your BMI for yourself, get a BMI table from your doctor or use an online tool, such as http://www.localbacon.com/ on the ToybContextus of SIMPLEROBB.COM. What causes obesity? When you take in more calories than you burn off, you gain weight. How you eat, how active you are, and other things affect how your body uses calories and whether you gain weight. If you have family members who have too much body fat, you may have inherited a tendency to gain weight. And your family also helps form your eating and lifestyle habits, which can lead to obesity. Also, our busy lives make it harder to plan and cook healthy meals. For many of us, it's easier to reach for prepared foods, go out to eat, or go to the drive-through. But these foods are often high in saturated fat and calories. Portions are often too large. What can you do to reach a healthy weight? Focus on health, not diets. Diets are hard to stay on and don't work in the long run.  It is very hard to stay with a diet that includes lots of big

## 2018-03-01 ENCOUNTER — OFFICE VISIT (OUTPATIENT)
Dept: ORTHOPEDIC SURGERY | Age: 48
End: 2018-03-01

## 2018-03-01 VITALS
HEART RATE: 97 BPM | HEIGHT: 72 IN | BODY MASS INDEX: 42.66 KG/M2 | DIASTOLIC BLOOD PRESSURE: 91 MMHG | WEIGHT: 315 LBS | SYSTOLIC BLOOD PRESSURE: 134 MMHG

## 2018-03-01 DIAGNOSIS — M25.561 PAIN IN BOTH KNEES, UNSPECIFIED CHRONICITY: Primary | ICD-10-CM

## 2018-03-01 DIAGNOSIS — M25.562 PAIN IN BOTH KNEES, UNSPECIFIED CHRONICITY: Primary | ICD-10-CM

## 2018-03-01 DIAGNOSIS — M17.12 PATELLOFEMORAL ARTHRITIS OF LEFT KNEE: ICD-10-CM

## 2018-03-01 PROCEDURE — 20610 DRAIN/INJ JOINT/BURSA W/O US: CPT | Performed by: ORTHOPAEDIC SURGERY

## 2018-03-01 PROCEDURE — 4004F PT TOBACCO SCREEN RCVD TLK: CPT | Performed by: ORTHOPAEDIC SURGERY

## 2018-03-01 PROCEDURE — G8484 FLU IMMUNIZE NO ADMIN: HCPCS | Performed by: ORTHOPAEDIC SURGERY

## 2018-03-01 PROCEDURE — 99213 OFFICE O/P EST LOW 20 MIN: CPT | Performed by: ORTHOPAEDIC SURGERY

## 2018-03-01 PROCEDURE — G8427 DOCREV CUR MEDS BY ELIG CLIN: HCPCS | Performed by: ORTHOPAEDIC SURGERY

## 2018-03-01 PROCEDURE — G8417 CALC BMI ABV UP PARAM F/U: HCPCS | Performed by: ORTHOPAEDIC SURGERY

## 2018-03-01 NOTE — PROGRESS NOTES
well.  He will continue on his home exercise program.  We will see him back in 3-6 months. After informed consent was provided, the patient lay supine on the exam table. The superolateral aspect of the left knee was prepped with Chlora-prep. The skin and subcutaneous tissues were anesthetized with ethyl chloride spray and 1% lidocaine injected with a 20-gauge needle. The needle was withdrawn. Through the same tract, an 18-gauge needle attached to a 50-mL syringe was inserted into the left knee. Then, 20mL of clear yellowish fluid was aspirated. The syringe was removed and through the needle,  2 ml of 40mg/ml DepoMedrol was injected. The needle was withdrawn and the puncture site sealed with a Band-Aid. The patient tolerated the procedure well. Sincerely,    Yumiko Freire MD  Clinical Fellow in Sports Medicine  Board Certified Orthopaedic Surgeon  [address] [facility]    I attest that my visit today with Tavon Peterson was supervised by Dr Demetrius Goddard    This dictation was performed with a verbal recognition program [facility] [employer]) and it was checked for errors. It is possible that there are still dictated errors within this office note. If so, please bring any errors to my attention for an addendum. All efforts were made to ensure that this office note is accurate. This dictation was performed with a verbal recognition program (DRAGON) and it was checked for errors. It is possible that there are still dictated errors within this office note. If so, please bring any errors to my attention for an addendum. All efforts were made to ensure that this office note is accurate. Supervising Physician Attestation:  I, Dr. Yolanda Elder, personally performed the services described in this documentation as scribed above, and it is both accurate and complete and I agree with all pertinent clinical information.   I personally interviewed the patient and performed a physical examination and medical

## 2018-03-26 ENCOUNTER — OFFICE VISIT (OUTPATIENT)
Dept: BARIATRICS/WEIGHT MGMT | Age: 48
End: 2018-03-26

## 2018-03-26 VITALS
DIASTOLIC BLOOD PRESSURE: 80 MMHG | HEIGHT: 72 IN | WEIGHT: 315 LBS | SYSTOLIC BLOOD PRESSURE: 128 MMHG | HEART RATE: 96 BPM | BODY MASS INDEX: 42.66 KG/M2

## 2018-03-26 DIAGNOSIS — E66.01 MORBID OBESITY WITH BMI OF 40.0-44.9, ADULT (HCC): ICD-10-CM

## 2018-03-26 DIAGNOSIS — R73.03 PREDIABETES: ICD-10-CM

## 2018-03-26 DIAGNOSIS — Z71.3 DIETARY COUNSELING AND SURVEILLANCE: ICD-10-CM

## 2018-03-26 DIAGNOSIS — E66.01 MORBID OBESITY WITH BMI OF 40.0-44.9, ADULT (HCC): Primary | ICD-10-CM

## 2018-03-26 DIAGNOSIS — E55.9 VITAMIN D INSUFFICIENCY: ICD-10-CM

## 2018-03-26 LAB
A/G RATIO: 1.7 (ref 1.1–2.2)
ALBUMIN SERPL-MCNC: 4.3 G/DL (ref 3.4–5)
ALP BLD-CCNC: 78 U/L (ref 40–129)
ALT SERPL-CCNC: 31 U/L (ref 10–40)
ANION GAP SERPL CALCULATED.3IONS-SCNC: 17 MMOL/L (ref 3–16)
AST SERPL-CCNC: 17 U/L (ref 15–37)
BASOPHILS ABSOLUTE: 0.1 K/UL (ref 0–0.2)
BASOPHILS RELATIVE PERCENT: 0.8 %
BILIRUB SERPL-MCNC: 0.5 MG/DL (ref 0–1)
BUN BLDV-MCNC: 13 MG/DL (ref 7–20)
CALCIUM SERPL-MCNC: 9.2 MG/DL (ref 8.3–10.6)
CHLORIDE BLD-SCNC: 104 MMOL/L (ref 99–110)
CHOLESTEROL, TOTAL: 195 MG/DL (ref 0–199)
CO2: 25 MMOL/L (ref 21–32)
CREAT SERPL-MCNC: 1.1 MG/DL (ref 0.9–1.3)
EOSINOPHILS ABSOLUTE: 0.1 K/UL (ref 0–0.6)
EOSINOPHILS RELATIVE PERCENT: 1.9 %
FOLATE: 5.75 NG/ML (ref 4.78–24.2)
GFR AFRICAN AMERICAN: >60
GFR NON-AFRICAN AMERICAN: >60
GLOBULIN: 2.5 G/DL
GLUCOSE BLD-MCNC: 96 MG/DL (ref 70–99)
HCT VFR BLD CALC: 45.1 % (ref 40.5–52.5)
HDLC SERPL-MCNC: 53 MG/DL (ref 40–60)
HEMOGLOBIN: 15.2 G/DL (ref 13.5–17.5)
LDL CHOLESTEROL CALCULATED: 116 MG/DL
LYMPHOCYTES ABSOLUTE: 1.9 K/UL (ref 1–5.1)
LYMPHOCYTES RELATIVE PERCENT: 26.1 %
MCH RBC QN AUTO: 32.4 PG (ref 26–34)
MCHC RBC AUTO-ENTMCNC: 33.7 G/DL (ref 31–36)
MCV RBC AUTO: 96.1 FL (ref 80–100)
MONOCYTES ABSOLUTE: 0.5 K/UL (ref 0–1.3)
MONOCYTES RELATIVE PERCENT: 7.4 %
NEUTROPHILS ABSOLUTE: 4.7 K/UL (ref 1.7–7.7)
NEUTROPHILS RELATIVE PERCENT: 63.8 %
PDW BLD-RTO: 14.2 % (ref 12.4–15.4)
PLATELET # BLD: 203 K/UL (ref 135–450)
PMV BLD AUTO: 10.8 FL (ref 5–10.5)
POTASSIUM SERPL-SCNC: 4.5 MMOL/L (ref 3.5–5.1)
RBC # BLD: 4.7 M/UL (ref 4.2–5.9)
SODIUM BLD-SCNC: 146 MMOL/L (ref 136–145)
TOTAL PROTEIN: 6.8 G/DL (ref 6.4–8.2)
TRIGL SERPL-MCNC: 130 MG/DL (ref 0–150)
TSH REFLEX: 1.92 UIU/ML (ref 0.27–4.2)
VITAMIN B-12: 693 PG/ML (ref 211–911)
VITAMIN D 25-HYDROXY: 36.3 NG/ML
VLDLC SERPL CALC-MCNC: 26 MG/DL
WBC # BLD: 7.3 K/UL (ref 4–11)

## 2018-03-26 PROCEDURE — G8427 DOCREV CUR MEDS BY ELIG CLIN: HCPCS | Performed by: FAMILY MEDICINE

## 2018-03-26 PROCEDURE — 99213 OFFICE O/P EST LOW 20 MIN: CPT | Performed by: FAMILY MEDICINE

## 2018-03-26 PROCEDURE — G8417 CALC BMI ABV UP PARAM F/U: HCPCS | Performed by: FAMILY MEDICINE

## 2018-03-26 PROCEDURE — 4004F PT TOBACCO SCREEN RCVD TLK: CPT | Performed by: FAMILY MEDICINE

## 2018-03-26 PROCEDURE — G8484 FLU IMMUNIZE NO ADMIN: HCPCS | Performed by: FAMILY MEDICINE

## 2018-03-26 ASSESSMENT — ENCOUNTER SYMPTOMS
EYES NEGATIVE: 1
RESPIRATORY NEGATIVE: 1
GASTROINTESTINAL NEGATIVE: 1

## 2018-03-26 NOTE — PATIENT INSTRUCTIONS
changes in your eating habits. Instead of a diet, focus on lifestyle changes that will improve your health and achieve the right balance of energy and calories. To lose weight, you need to burn more calories than you take in. You can do it by eating healthy foods in reasonable amounts and becoming more active, even a little bit every day. Making small changes over time can add up to a lot. Make a plan for change. Many people have found that naming their reasons for change and staying focused on their plan can make a big difference. Work with your doctor to create a plan that is right for you. · Ask yourself: Roberto Jarrett are my personal, most powerful reasons for wanting this change? What will my life look like when I've made the change? \"  · Set your long-term goal. Make it specific, such as \"I will lose x pounds. \"  · Break your long-term goal into smaller, short-term goals. Make these small steps specific and within your reach, things you know you can do. These steps are what keep you going from day to day. How can you stay on your plan for change? Be ready. Choose to start during a time when there are few events that might trigger slip-ups, like holidays, social events, and high-stress periods. Decide on your first few steps. Most people have more success when they make small changes, one step at a time. For example, you might switch a daily candy bar to a piece of fruit, walk 10 minutes more, or add more vegetables to a meal.  Line up your support people. Make sure you're not going to be alone as you make this change. Connect with people who understand how important it is to you. Ask family members and friends for help in keeping with your plan. And think about who could make it harder for you, and how to handle them. Try tracking. People who keep track of what they eat, feel, and do are better at losing weight. Try writing down things like:  · What and how much you eat.   · How you feel before and after each meal.  · Details about each meal (like eating out or at home, eating alone, or with friends or family). · What you do to be active. Look and plan. As you track, look for patterns that you may want to change. Take note of:  · When you eat and whether you skip meals. · How often you eat out. · How many fruits and vegetables you eat. · When you eat beyond feeling full. · When and why you eat for reasons other than being hungry. When you stray from your plan, don't get upset. Figure out what made you slip up and how you can fix it. Can you take medicines or have surgery to lose weight? Before your doctor will prescribe medicines or surgery, he or she will probably want you to be more active and follow your healthy eating plan for a period of time. These habits are key lifelong changes for managing your weight, with or without other medical treatment. And these changes can help you avoid weight-related health problems. Follow-up care is a key part of your treatment and safety. Be sure to make and go to all appointments, and call your doctor if you are having problems. It's also a good idea to know your test results and keep a list of the medicines you take. Where can you learn more? Go to https://[email or web address] Re.Helium. org and sign in to your FreeCharge account. Enter N111 in the Hurray! box to learn more about \"Learning About Obesity. \"     If you do not have an account, please click on the \"Sign Up Now\" link. Current as of: October 13, 2016  Content Version: 11.5  © 3583-1558 Healthwise, Incorporated. Care instructions adapted under license by [facility] ([facility]). If you have questions about a medical condition or this instruction, always ask your healthcare professional. [address] any warranty or liability for your use of this information.

## 2018-03-26 NOTE — PROGRESS NOTES
K/uL Final    Bands Relative 10/12/2017 1  0 - 7 % Final         Assessment and Plan:    ICD-10-CM ICD-9-CM    1. Morbid obesity with BMI of 40.0-44.9, adult ([facility] [address] E66.01 278.01 Improving. Reinforced  1500-Calorie/low carb meal plan. Check labs as ordered. Plan is to start Optifast 1100-Calorie meal plan at next visit depending on the lab results. Start exercising. F/u in 3-4 weeks. Z68.41 V85.41    2. Dietary counseling and surveillance Z71.3 V65.3 1500-Cristo/low carb   3. Prediabetes R73.03 790.29 Check HbA1c   4. Vitamin D insufficiency E55.9 268.9 Check vit D level       Nutrition plan: [] LCHF/Ketogenic   [x] Modified low-calorie diet (low carb/low-cristo)               [] Low-calorie diet    []Maintenance       []Other    Exercise: [x]Cardio     [x]Resistance/strength training                       [x]ACSM recommendations (150 minutes/week in active weight loss)                              Behavior: [x]Motivational interviewing performed    [] Referral for counseling                         [x]Discussed strategies to overcome habits/challenges for focus         [] Stress management   [x] Stimulus control                    [] Sleep hygiene    Reviewed:  [x] Nutrition and the importance of regular protein intake  [x] Hidden carbohydrate sources  [x] Alcohol use  [x] Tobacco use/ Drug use (patient understands that he will to avoid marijuana use to start OPTIFAST/aom)  [x] Importance of exercise and reducing sedentary time          No orders of the defined types were placed in this encounter. No Follow-up on file. This dictation was performed with a verbal recognition program (Dragon) and all efforts were made to ensure accuracy of this dictation. It is possible that there are still dictated errors within this note. If so, please bring any errors to my attention for correction.

## 2018-03-27 LAB
ESTIMATED AVERAGE GLUCOSE: 125.5 MG/DL
HBA1C MFR BLD: 6 %

## 2018-04-24 ENCOUNTER — OFFICE VISIT (OUTPATIENT)
Dept: BARIATRICS/WEIGHT MGMT | Age: 48
End: 2018-04-24

## 2018-04-24 VITALS
HEART RATE: 88 BPM | HEIGHT: 72 IN | WEIGHT: 315 LBS | SYSTOLIC BLOOD PRESSURE: 130 MMHG | BODY MASS INDEX: 42.66 KG/M2 | DIASTOLIC BLOOD PRESSURE: 86 MMHG

## 2018-04-24 DIAGNOSIS — Z71.3 DIETARY COUNSELING AND SURVEILLANCE: ICD-10-CM

## 2018-04-24 DIAGNOSIS — R73.03 PREDIABETES: ICD-10-CM

## 2018-04-24 DIAGNOSIS — E66.01 MORBID OBESITY WITH BMI OF 40.0-44.9, ADULT (HCC): Primary | ICD-10-CM

## 2018-04-24 PROCEDURE — G8427 DOCREV CUR MEDS BY ELIG CLIN: HCPCS | Performed by: FAMILY MEDICINE

## 2018-04-24 PROCEDURE — G8417 CALC BMI ABV UP PARAM F/U: HCPCS | Performed by: FAMILY MEDICINE

## 2018-04-24 PROCEDURE — 4004F PT TOBACCO SCREEN RCVD TLK: CPT | Performed by: FAMILY MEDICINE

## 2018-04-24 PROCEDURE — 99213 OFFICE O/P EST LOW 20 MIN: CPT | Performed by: FAMILY MEDICINE

## 2018-04-24 ASSESSMENT — ENCOUNTER SYMPTOMS
EYES NEGATIVE: 1
GASTROINTESTINAL NEGATIVE: 1
RESPIRATORY NEGATIVE: 1

## 2018-05-07 ENCOUNTER — OFFICE VISIT (OUTPATIENT)
Dept: BARIATRICS/WEIGHT MGMT | Age: 48
End: 2018-05-07

## 2018-05-07 VITALS
DIASTOLIC BLOOD PRESSURE: 84 MMHG | SYSTOLIC BLOOD PRESSURE: 130 MMHG | HEART RATE: 84 BPM | HEIGHT: 72 IN | WEIGHT: 314.5 LBS | BODY MASS INDEX: 42.6 KG/M2

## 2018-05-07 DIAGNOSIS — E66.01 MORBID OBESITY WITH BMI OF 40.0-44.9, ADULT (HCC): Primary | ICD-10-CM

## 2018-05-07 DIAGNOSIS — Z71.3 DIETARY COUNSELING AND SURVEILLANCE: ICD-10-CM

## 2018-05-07 PROCEDURE — G8427 DOCREV CUR MEDS BY ELIG CLIN: HCPCS | Performed by: FAMILY MEDICINE

## 2018-05-07 PROCEDURE — 99213 OFFICE O/P EST LOW 20 MIN: CPT | Performed by: FAMILY MEDICINE

## 2018-05-07 PROCEDURE — G8417 CALC BMI ABV UP PARAM F/U: HCPCS | Performed by: FAMILY MEDICINE

## 2018-05-07 PROCEDURE — 4004F PT TOBACCO SCREEN RCVD TLK: CPT | Performed by: FAMILY MEDICINE

## 2018-05-07 ASSESSMENT — ENCOUNTER SYMPTOMS
GASTROINTESTINAL NEGATIVE: 1
EYES NEGATIVE: 1
RESPIRATORY NEGATIVE: 1

## 2018-06-13 ENCOUNTER — OFFICE VISIT (OUTPATIENT)
Dept: BARIATRICS/WEIGHT MGMT | Age: 48
End: 2018-06-13

## 2018-06-13 VITALS
SYSTOLIC BLOOD PRESSURE: 126 MMHG | BODY MASS INDEX: 41.17 KG/M2 | HEART RATE: 76 BPM | DIASTOLIC BLOOD PRESSURE: 82 MMHG | WEIGHT: 304 LBS | HEIGHT: 72 IN

## 2018-06-13 DIAGNOSIS — E66.01 MORBID OBESITY WITH BMI OF 40.0-44.9, ADULT (HCC): Primary | ICD-10-CM

## 2018-06-13 DIAGNOSIS — Z71.3 DIETARY COUNSELING AND SURVEILLANCE: ICD-10-CM

## 2018-06-13 PROCEDURE — G8417 CALC BMI ABV UP PARAM F/U: HCPCS | Performed by: FAMILY MEDICINE

## 2018-06-13 PROCEDURE — 99213 OFFICE O/P EST LOW 20 MIN: CPT | Performed by: FAMILY MEDICINE

## 2018-06-13 PROCEDURE — G8427 DOCREV CUR MEDS BY ELIG CLIN: HCPCS | Performed by: FAMILY MEDICINE

## 2018-06-13 PROCEDURE — 4004F PT TOBACCO SCREEN RCVD TLK: CPT | Performed by: FAMILY MEDICINE

## 2018-06-14 ASSESSMENT — ENCOUNTER SYMPTOMS
RESPIRATORY NEGATIVE: 1
GASTROINTESTINAL NEGATIVE: 1
EYES NEGATIVE: 1

## 2018-07-16 ENCOUNTER — OFFICE VISIT (OUTPATIENT)
Dept: ORTHOPEDIC SURGERY | Age: 48
End: 2018-07-16

## 2018-07-16 VITALS
HEART RATE: 87 BPM | DIASTOLIC BLOOD PRESSURE: 74 MMHG | BODY MASS INDEX: 40.23 KG/M2 | HEIGHT: 72 IN | WEIGHT: 297 LBS | SYSTOLIC BLOOD PRESSURE: 130 MMHG

## 2018-07-16 DIAGNOSIS — S90.111A CONTUSION OF RIGHT GREAT TOE WITHOUT DAMAGE TO NAIL, INITIAL ENCOUNTER: Primary | ICD-10-CM

## 2018-07-16 PROCEDURE — G8427 DOCREV CUR MEDS BY ELIG CLIN: HCPCS | Performed by: PODIATRIST

## 2018-07-16 PROCEDURE — 99213 OFFICE O/P EST LOW 20 MIN: CPT | Performed by: PODIATRIST

## 2018-07-16 PROCEDURE — G8417 CALC BMI ABV UP PARAM F/U: HCPCS | Performed by: PODIATRIST

## 2018-07-16 PROCEDURE — 4004F PT TOBACCO SCREEN RCVD TLK: CPT | Performed by: PODIATRIST

## 2018-07-19 ENCOUNTER — OFFICE VISIT (OUTPATIENT)
Dept: BARIATRICS/WEIGHT MGMT | Age: 48
End: 2018-07-19

## 2018-07-19 VITALS
HEART RATE: 100 BPM | WEIGHT: 297 LBS | HEIGHT: 72 IN | SYSTOLIC BLOOD PRESSURE: 136 MMHG | DIASTOLIC BLOOD PRESSURE: 86 MMHG | BODY MASS INDEX: 40.23 KG/M2

## 2018-07-19 DIAGNOSIS — Z71.3 DIETARY COUNSELING AND SURVEILLANCE: ICD-10-CM

## 2018-07-19 DIAGNOSIS — E66.01 MORBID OBESITY WITH BMI OF 40.0-44.9, ADULT (HCC): Primary | ICD-10-CM

## 2018-07-19 PROCEDURE — 4004F PT TOBACCO SCREEN RCVD TLK: CPT | Performed by: FAMILY MEDICINE

## 2018-07-19 PROCEDURE — G8427 DOCREV CUR MEDS BY ELIG CLIN: HCPCS | Performed by: FAMILY MEDICINE

## 2018-07-19 PROCEDURE — G8417 CALC BMI ABV UP PARAM F/U: HCPCS | Performed by: FAMILY MEDICINE

## 2018-07-19 PROCEDURE — 99213 OFFICE O/P EST LOW 20 MIN: CPT | Performed by: FAMILY MEDICINE

## 2018-07-19 ASSESSMENT — ENCOUNTER SYMPTOMS
RESPIRATORY NEGATIVE: 1
GASTROINTESTINAL NEGATIVE: 1
EYES NEGATIVE: 1

## 2018-07-19 NOTE — PATIENT INSTRUCTIONS
Patient Education        Learning About Obesity  What is obesity? Obesity means having so much body fat that your health is in danger. Having too much body fat can lead to type 2 diabetes, heart disease, high blood pressure, arthritis, sleep apnea, and stroke. Even if you don't feel bad now, think about these health risks. Do they seem like a good reason to start on a new path toward a healthier weight? Or do you have another personal, powerful reason for wanting to lose weight? Whatever it is, keep it in mind. It can be hard to change eating habits and exercise habits. But with your own reason and plan, you can do it. How do you know if your weight is in the obesity range? To know if your weight is in the obesity range, your doctor looks at your body mass index (BMI) and waist size. Your BMI is a number that is calculated from your weight and your height. To figure your BMI for yourself, get a BMI table from your doctor or use an online tool, such as http://www.DailyBooth.com/ on the Toy[employer]ou[employer] of [employer]. What causes obesity? When you take in more calories than you burn off, you gain weight. How you eat, how active you are, and other things affect how your body uses calories and whether you gain weight. If you have family members who have too much body fat, you may have inherited a tendency to gain weight. And your family also helps form your eating and lifestyle habits, which can lead to obesity. Also, our busy lives make it harder to plan and cook healthy meals. For many of us, it's easier to reach for prepared foods, go out to eat, or go to the drive-through. But these foods are often high in saturated fat and calories. Portions are often too large. What can you do to reach a healthy weight? Focus on health, not diets. Diets are hard to stay on and don't work in the long run.  It is very hard to stay with a diet that includes lots of big meal.  · Details about each meal (like eating out or at home, eating alone, or with friends or family). · What you do to be active. Look and plan. As you track, look for patterns that you may want to change. Take note of:  · When you eat and whether you skip meals. · How often you eat out. · How many fruits and vegetables you eat. · When you eat beyond feeling full. · When and why you eat for reasons other than being hungry. When you stray from your plan, don't get upset. Figure out what made you slip up and how you can fix it. Can you take medicines or have surgery to lose weight? Before your doctor will prescribe medicines or surgery, he or she will probably want you to be more active and follow your healthy eating plan for a period of time. These habits are key lifelong changes for managing your weight, with or without other medical treatment. And these changes can help you avoid weight-related health problems. Follow-up care is a key part of your treatment and safety. Be sure to make and go to all appointments, and call your doctor if you are having problems. It's also a good idea to know your test results and keep a list of the medicines you take. Where can you learn more? Go to https://KiipeWhyd.[employer]. org and sign in to your Elementum account. Enter N111 in the Freedom2 box to learn more about \"Learning About Obesity. \"     If you do not have an account, please click on the \"Sign Up Now\" link. Current as of: October 9, 2017  Content Version: 11.6  © 7794-6136 [employer], Incorporated. Care instructions adapted under license by [facility] ([facility]). If you have questions about a medical condition or this instruction, always ask your healthcare professional. [address] any warranty or liability for your use of this information.

## 2018-07-19 NOTE — PROGRESS NOTES
deficiency    Prediabetes    Iron deficiency    Contusion of right great toe without damage to nail       Review of Systems   HENT: Negative. Eyes: Negative. Respiratory: Negative. Cardiovascular: Negative. Gastrointestinal: Negative. Endocrine: Negative. Musculoskeletal: Negative. Neurological: Negative. Psychiatric/Behavioral: Negative. Physical Exam   Constitutional: He is oriented to person, place, and time. He appears well-developed and well-nourished. Cardiovascular: Normal rate, regular rhythm and normal heart sounds. Pulmonary/Chest: Effort normal and breath sounds normal. No respiratory distress. He has no wheezes. He has no rales. Neurological: He is alert and oriented to person, place, and time. Skin: Skin is warm and dry. Psychiatric: He has a normal mood and affect. His behavior is normal. Judgment and thought content normal.       Orders Only on 03/26/2018   Component Date Value Ref Range Status    Sodium 03/26/2018 146* 136 - 145 mmol/L Final    Potassium 03/26/2018 4.5  3.5 - 5.1 mmol/L Final    Chloride 03/26/2018 104  99 - 110 mmol/L Final    CO2 03/26/2018 25  21 - 32 mmol/L Final    Anion Gap 03/26/2018 17* 3 - 16 Final    Glucose 03/26/2018 96  70 - 99 mg/dL Final    BUN 03/26/2018 13  7 - 20 mg/dL Final    CREATININE 03/26/2018 1.1  0.9 - 1.3 mg/dL Final    GFR Non- 03/26/2018 >60  >60 Final    Comment: >60 mL/min/1.73m2 EGFR, calc. for ages 25 and older using the  MDRD formula (not corrected for weight), is valid for stable  renal function.  GFR  03/26/2018 >60  >60 Final    Comment: Chronic Kidney Disease: less than 60 ml/min/1.73 sq.m. Kidney Failure: less than 15 ml/min/1.73 sq.m. Results valid for patients 18 years and older.       Calcium 03/26/2018 9.2  8.3 - 10.6 mg/dL Final    Total Protein 03/26/2018 6.8  6.4 - 8.2 g/dL Final    Alb 03/26/2018 4.3  3.4 - 5.0 g/dL Final    dictated errors within this note. If so, please bring any errors to my attention for correction.

## 2018-08-23 ENCOUNTER — OFFICE VISIT (OUTPATIENT)
Dept: BARIATRICS/WEIGHT MGMT | Age: 48
End: 2018-08-23

## 2018-08-23 VITALS
DIASTOLIC BLOOD PRESSURE: 82 MMHG | HEIGHT: 72 IN | SYSTOLIC BLOOD PRESSURE: 134 MMHG | WEIGHT: 298 LBS | BODY MASS INDEX: 40.36 KG/M2 | HEART RATE: 88 BPM

## 2018-08-23 DIAGNOSIS — E66.01 MORBID OBESITY WITH BMI OF 40.0-44.9, ADULT (HCC): Primary | ICD-10-CM

## 2018-08-23 DIAGNOSIS — Z71.3 DIETARY COUNSELING AND SURVEILLANCE: ICD-10-CM

## 2018-08-23 PROCEDURE — G8427 DOCREV CUR MEDS BY ELIG CLIN: HCPCS | Performed by: FAMILY MEDICINE

## 2018-08-23 PROCEDURE — 99213 OFFICE O/P EST LOW 20 MIN: CPT | Performed by: FAMILY MEDICINE

## 2018-08-23 PROCEDURE — 4004F PT TOBACCO SCREEN RCVD TLK: CPT | Performed by: FAMILY MEDICINE

## 2018-08-23 PROCEDURE — G8417 CALC BMI ABV UP PARAM F/U: HCPCS | Performed by: FAMILY MEDICINE

## 2018-08-23 ASSESSMENT — ENCOUNTER SYMPTOMS
RESPIRATORY NEGATIVE: 1
EYES NEGATIVE: 1
GASTROINTESTINAL NEGATIVE: 1

## 2018-08-23 NOTE — PROGRESS NOTES
Patient: Gayle Sanz                      Encounter Date: 8/23/2018    YOB: 1970               Age: 57 y.o. Chief Complaint   Patient presents with    Weight Management     8th MWM, Optifast        /82   Pulse 88   Ht 6' (1.829 m)   Wt 298 lb (135.2 kg)   BMI 40.42 kg/m²     Body mass index is 40.42 kg/m². HPI: 57 y.o. male with a long-standing history of obesity presents today for follow-up. He has gained a pound since his last visit. Current treatment includes OPTIFAST 4:1 diet and exercise. Sometimes only using 2-3 meal replacements a day due to busy work schedule. Met with the dietitian today. Food recall and assessment reviewed. Diet: []LCHF/Ketogenic   []Modified low-calorie/low carb diet  [x]Low-calorie diet          []Maintenance       []Other:         Adherent?  []Yes     [x]No       Side effects: No         Exercise: [x]Cardio     [x]Resistance/strength training     []Other: No intentional exercise, but trying to be physically active     Allergies   Allergen Reactions    Other Other (See Comments)     cats         Current Outpatient Prescriptions:     ferrous sulfate 325 (65 Fe) MG tablet, Take 1 tablet by mouth Daily with supper, Disp: 90 tablet, Rfl: 0    Cholecalciferol (VITAMIN D) 2000 units TABS tablet, Take 1 tablet by mouth daily, Disp: 90 tablet, Rfl: 0    naproxen-diphenhydramine 220-25 MG TABS, Take by mouth, Disp: , Rfl:     azelastine (ASTELIN) 0.1 % nasal spray, 1 spray by Nasal route 2 times daily Use in each nostril as directed, Disp: , Rfl:     Fluticasone Propionate (FLONASE ALLERGY RELIEF NA), by Nasal route, Disp: , Rfl:     Patient Active Problem List   Diagnosis    Patellofemoral syndrome of left knee    Knee effusion, left    Flat feet    Left knee pain    Pes planus of both feet    Patellofemoral arthritis of right knee    Right knee pain    Morbid obesity with BMI of 40.0-44.9, adult (HCC)    Pre-operative clearance    Neutrophils % 03/26/2018 63.8  % Final    Lymphocytes % 03/26/2018 26.1  % Final    Monocytes % 03/26/2018 7.4  % Final    Eosinophils % 03/26/2018 1.9  % Final    Basophils % 03/26/2018 0.8  % Final    Neutrophils # 03/26/2018 4.7  1.7 - 7.7 K/uL Final    Lymphocytes # 03/26/2018 1.9  1.0 - 5.1 K/uL Final    Monocytes # 03/26/2018 0.5  0.0 - 1.3 K/uL Final    Eosinophils # 03/26/2018 0.1  0.0 - 0.6 K/uL Final    Basophils # 03/26/2018 0.1  0.0 - 0.2 K/uL Final         Assessment and Plan:    ICD-10-CM ICD-9-CM    1. Morbid obesity with BMI of 40.0-44.9, adult ([facility]) E66.01 278.01 Continue OPTIFAST 4:1 plan for one more month before transitioning to 3:2 meal plan. Reinforced dietitian's recommendations. Avoid skipping meals. Continue regular exercise. Z68.41 V85.41    2. Dietary counseling and surveillance Z71.3 V65.3        Nutrition plan: [] LCHF/Ketogenic   [] Modified low-calorie diet (low carb/low-edgard)               [x] Low-calorie diet    []Maintenance       []Other    Exercise: [x]Cardio     [x]Resistance/strength training                       [x]ACSM recommendations (150 minutes/week in active weight loss)                              Behavior: [x]Motivational interviewing performed    [] Referral for counseling                         [x]Discussed strategies to overcome habits/challenges for focus         [] Stress management   [x] Stimulus control                    [] Sleep hygiene    Reviewed:  [x] Nutrition and the importance of regular protein intake  [x] Hidden carbohydrate sources  [x] Alcohol use  [x] Tobacco use   [x] Importance of exercise and reducing sedentary time  [x] Proper use of OPTIFAST and side effects       Treatment start date: 4/25/18  16 weeks: 8/25/18  Starting weight: 317 pounds  Total weight loss: 19.5 pounds    No orders of the defined types were placed in this encounter. No Follow-up on file.         This dictation was performed with a verbal recognition

## 2018-08-23 NOTE — PATIENT INSTRUCTIONS
Patient Education        Learning About Obesity  What is obesity? Obesity means having so much body fat that your health is in danger. Having too much body fat can lead to type 2 diabetes, heart disease, high blood pressure, arthritis, sleep apnea, and stroke. Even if you don't feel bad now, think about these health risks. Do they seem like a good reason to start on a new path toward a healthier weight? Or do you have another personal, powerful reason for wanting to lose weight? Whatever it is, keep it in mind. It can be hard to change eating habits and exercise habits. But with your own reason and plan, you can do it. How do you know if your weight is in the obesity range? To know if your weight is in the obesity range, your doctor looks at your body mass index (BMI) and waist size. Your BMI is a number that is calculated from your weight and your height. To figure your BMI for yourself, get a BMI table from your doctor or use an online tool, such as http://www.holman.com/ on the [employer] of [employer]. A healthy BMI is from 18.5 to 24.9. If your BMI is from 30.0 to 39.9, you are considered to have obesity. If your BMI is over 40.0, you are considered to have extreme obesity. What causes obesity? When you take in more calories than you burn off, you gain weight. How you eat, how active you are, and other things affect how your body uses calories and whether you gain weight. If you have family members who have too much body fat, you may have inherited a tendency to gain weight. And your family also helps form your eating and lifestyle habits, which can lead to obesity. Also, our busy lives make it harder to plan and cook healthy meals. For many of us, it's easier to reach for prepared foods, go out to eat, or go to the drive-through. But these foods are often high in saturated fat and calories. Portions are often too large.   What can you do to reach a healthy weight? Focus on health, not diets. Diets are hard to stay on and don't work in the long run. It is very hard to stay with a diet that includes lots of big changes in your eating habits. Instead of a diet, focus on lifestyle changes that will improve your health and achieve the right balance of energy and calories. To lose weight, you need to burn more calories than you take in. You can do it by eating healthy foods in reasonable amounts and becoming more active, even a little bit every day. Making small changes over time can add up to a lot. Make a plan for change. Many people have found that naming their reasons for change and staying focused on their plan can make a big difference. Work with your doctor to create a plan that is right for you. · Ask yourself: [place] are my personal, most powerful reasons for wanting this change? What will my life look like when I've made the change? \"  · Set your long-term goal. Make it specific, such as \"I will lose x pounds. \"  · Break your long-term goal into smaller, short-term goals. Make these small steps specific and within your reach, things you know you can do. These steps are what keep you going from day to day. Talk with your doctor about other weight-loss options. If you have a BMI in a certain range and have not been able to lose weight with diet and exercise, medicine or surgery may be an option for you. Before your doctor will prescribe medicines or surgery, he or she will probably want you to be more active and follow your healthy eating plan for a period of time. These habits are key lifelong changes for managing your weight, with or without other medical treatment. And these changes can help you avoid weight-related health problems. How can you stay on your plan for change? Be ready. Choose to start during a time when there are few events that might trigger slip-ups, like holidays, social events, and high-stress periods.   Decide on your first few steps. Most people have more success when they make small changes, one step at a time. For example, you might switch a daily candy bar to a piece of fruit, walk 10 minutes more, or add more vegetables to a meal.  Line up your support people. Make sure you're not going to be alone as you make this change. Connect with people who understand how important it is to you. Ask family members and friends for help in keeping with your plan. And think about who could make it harder for you, and how to handle them. Try tracking. People who keep track of what they eat, feel, and do are better at losing weight. Try writing down things like:  · What and how much you eat. · How you feel before and after each meal.  · Details about each meal (like eating out or at home, eating alone, or with friends or family). · What you do to be active. Look and plan. As you track, look for patterns that you may want to change. Take note of:  · When you eat and whether you skip meals. · How often you eat out. · How many fruits and vegetables you eat. · When you eat beyond feeling full. · When and why you eat for reasons other than being hungry. When you stray from your plan, don't get upset. Figure out what made you slip up and how you can fix it. Can you take medicines or have surgery to lose weight? If you have a BMI in a certain range and have not been able to lose weight with diet and exercise, medicine or surgery may be an option for you. If you have a BMI of at least 30.0 (or a BMI of at least 27.0 and another health problem related to your weight), ask your doctor about weight-loss medicines. They work by making you feel less hungry, making you feel full more quickly, or changing how you digest fat. Medicines are used along with diet changes and more physical activity to help you make lasting changes.   If you have a BMI of 40.0 or more (or a BMI of 35.0 or more and another health problem related to your weight), your doctor may talk with you about surgery. Weight-loss surgery has risks, and you will need to work with your doctor to compare the risk of having obesity with the risks of surgery. With any option you choose, you will still need to eat a healthy diet and get regular exercise. Follow-up care is a key part of your treatment and safety. Be sure to make and go to all appointments, and call your doctor if you are having problems. It's also a good idea to know your test results and keep a list of the medicines you take. Where can you learn more? Go to https://[employer].9SLIDES. org and sign in to your [facility] account. Enter N111 in the Rank By Search box to learn more about \"Learning About Obesity. \"     If you do not have an account, please click on the \"Sign Up Now\" link. Current as of: October 9, 2017  Content Version: 11.7  © 3967-5963 [employer]. Care instructions adapted under license by [facility] ([facility]). If you have questions about a medical condition or this instruction, always ask your healthcare professional. [place] [address] any warranty or liability for your use of this information.      Plan/Goals:   - Increase fluid intake to 64 oz  - Consistently have 4 products  - Continue current exercise routine

## 2018-09-06 ENCOUNTER — OFFICE VISIT (OUTPATIENT)
Dept: ORTHOPEDIC SURGERY | Age: 48
End: 2018-09-06

## 2018-09-06 VITALS
WEIGHT: 298 LBS | HEIGHT: 72 IN | SYSTOLIC BLOOD PRESSURE: 129 MMHG | HEART RATE: 78 BPM | BODY MASS INDEX: 40.36 KG/M2 | DIASTOLIC BLOOD PRESSURE: 81 MMHG

## 2018-09-06 DIAGNOSIS — M22.2X2 PATELLOFEMORAL SYNDROME OF LEFT KNEE: ICD-10-CM

## 2018-09-06 DIAGNOSIS — M25.462 KNEE EFFUSION, LEFT: ICD-10-CM

## 2018-09-06 DIAGNOSIS — M17.11 PATELLOFEMORAL ARTHRITIS OF RIGHT KNEE: Primary | ICD-10-CM

## 2018-09-06 DIAGNOSIS — M21.41 FLAT FEET: ICD-10-CM

## 2018-09-06 DIAGNOSIS — M21.42 FLAT FEET: ICD-10-CM

## 2018-09-06 DIAGNOSIS — M25.562 LEFT KNEE PAIN, UNSPECIFIED CHRONICITY: ICD-10-CM

## 2018-09-06 PROCEDURE — 99999 PR OFFICE/OUTPT VISIT,PROCEDURE ONLY: CPT | Performed by: PHYSICIAN ASSISTANT

## 2018-09-06 PROCEDURE — 20610 DRAIN/INJ JOINT/BURSA W/O US: CPT | Performed by: PHYSICIAN ASSISTANT

## 2018-09-26 PROBLEM — Z01.818 PRE-OPERATIVE CLEARANCE: Status: RESOLVED | Noted: 2017-08-31 | Resolved: 2018-09-26

## 2018-09-27 ENCOUNTER — OFFICE VISIT (OUTPATIENT)
Dept: BARIATRICS/WEIGHT MGMT | Age: 48
End: 2018-09-27
Payer: MEDICAID

## 2018-09-27 VITALS
HEIGHT: 72 IN | BODY MASS INDEX: 39.75 KG/M2 | SYSTOLIC BLOOD PRESSURE: 128 MMHG | WEIGHT: 293.5 LBS | DIASTOLIC BLOOD PRESSURE: 82 MMHG | HEART RATE: 88 BPM

## 2018-09-27 DIAGNOSIS — E66.9 CLASS 2 OBESITY: Primary | ICD-10-CM

## 2018-09-27 DIAGNOSIS — Z71.3 DIETARY COUNSELING AND SURVEILLANCE: ICD-10-CM

## 2018-09-27 PROCEDURE — 99213 OFFICE O/P EST LOW 20 MIN: CPT | Performed by: FAMILY MEDICINE

## 2018-09-27 PROCEDURE — 4004F PT TOBACCO SCREEN RCVD TLK: CPT | Performed by: FAMILY MEDICINE

## 2018-09-27 PROCEDURE — G8417 CALC BMI ABV UP PARAM F/U: HCPCS | Performed by: FAMILY MEDICINE

## 2018-09-27 PROCEDURE — G8427 DOCREV CUR MEDS BY ELIG CLIN: HCPCS | Performed by: FAMILY MEDICINE

## 2018-09-27 ASSESSMENT — ENCOUNTER SYMPTOMS
RESPIRATORY NEGATIVE: 1
EYES NEGATIVE: 1
GASTROINTESTINAL NEGATIVE: 1

## 2018-09-27 NOTE — PROGRESS NOTES
Juan Antonio Fang lost 4.5 lbs over past month. He tried to take products to [facility] and TSA made him dispose of some of the products in his carry on- He spent 10 days in [facility] and just came back yesterday. Current treatment plan:   Patient is not on medication. Negative side effects from medications? N/A  Patient is  on Optifast.   Patient is not on diet and exercise only plan. Treatment plan details: Optifast 4+1    Is patient adhering to diet/meal plan?: not following when in [facility] but tried to take products, was using 4 products/day before traveling    Carbohydrate consumption:     Breakfast: Tomatoes with 2-3 scrambled eggs with 3 sausages with multigrain bread roll     Snack: nothing    Lunch: Veggie pizza OR Sandwiches with meat and nonstarchy veggies OR Pan seared fish     Snack: nothing     Dinner: Veggie pizza OR Sandwiches with meat and nonstarchy veggies OR Pan seared fish     Snack: nothing     Fluids: optifast shake, water, Radlers (beer/spring water/fruit juice mixture)     Consuming at least 64oz of calorie free fluids? A little short of this     Participating in intentional exercise?  Yes Details: Walking a lot over in [facility]- Walking well over 10K-30 K steps/day, also did weights in the hotel    Plan/Goals: increase fluid intake, continue with exercise, restart using 4 products     Handouts: none     Leti Mcgee

## 2018-09-27 NOTE — PROGRESS NOTES
03/26/2018 31  10 - 40 U/L Final    AST 03/26/2018 17  15 - 37 U/L Final    Globulin 03/26/2018 2.5  g/dL Final    Hemoglobin A1C 03/26/2018 6.0  See comment % Final    Comment: Comment:  Diagnosis of Diabetes: > or = 6.5%  Increased risk of diabetes (Prediabetes): 5.7-6.4%  Glycemic Control: Nonpregnant Adults: <7.0%                    Pregnant: <6.0%        eAG 03/26/2018 125.5  mg/dL Final    Cholesterol, Total 03/26/2018 195  0 - 199 mg/dL Final    Triglycerides 03/26/2018 130  0 - 150 mg/dL Final    HDL 03/26/2018 53  40 - 60 mg/dL Final    LDL Calculated 03/26/2018 116* <100 mg/dL Final    VLDL Cholesterol Calculated 03/26/2018 26  Not Established mg/dL Final    TSH 03/26/2018 1.92  0.27 - 4.20 uIU/mL Final    Vitamin B-12 03/26/2018 693  211 - 911 pg/mL Final    Folate 03/26/2018 5.75  4.78 - 24.20 ng/mL Final    Comment: Effective 11-15-16 10:00am EST  Please note reference ranges have  changed for Folate.  Vit D, 25-Hydroxy 03/26/2018 36.3  >=30 ng/mL Final    Comment: <=20 ng/mL. ........... Ela Dawson Deficient  21-29 ng/mL. ......... Ela Dawson Insufficient  >=30 ng/mL. ........ Ela Dawson Sufficient      WBC 03/26/2018 7.3  4.0 - 11.0 K/uL Final    RBC 03/26/2018 4.70  4.20 - 5.90 M/uL Final    Hemoglobin 03/26/2018 15.2  13.5 - 17.5 g/dL Final    Hematocrit 03/26/2018 45.1  40.5 - 52.5 % Final    MCV 03/26/2018 96.1  80.0 - 100.0 fL Final    MCH 03/26/2018 32.4  26.0 - 34.0 pg Final    MCHC 03/26/2018 33.7  31.0 - 36.0 g/dL Final    RDW 03/26/2018 14.2  12.4 - 15.4 % Final    Platelets 04/73/9834 203  135 - 450 K/uL Final    MPV 03/26/2018 10.8* 5.0 - 10.5 fL Final    Neutrophils % 03/26/2018 63.8  % Final    Lymphocytes % 03/26/2018 26.1  % Final    Monocytes % 03/26/2018 7.4  % Final    Eosinophils % 03/26/2018 1.9  % Final    Basophils % 03/26/2018 0.8  % Final    Neutrophils # 03/26/2018 4.7  1.7 - 7.7 K/uL Final    Lymphocytes # 03/26/2018 1.9  1.0 - 5.1 K/uL Final    Monocytes # 03/26/2018 0.5 0.0 - 1.3 K/uL Final    Eosinophils # 03/26/2018 0.1  0.0 - 0.6 K/uL Final    Basophils # 03/26/2018 0.1  0.0 - 0.2 K/uL Final         Assessment and Plan:    ICD-10-CM ICD-9-CM    1. Class 2 obesity E66.9 278.00 Improving. Continue current management. Reinforced dietitian's recommendations. Follow-up in 4 weeks. 2. BMI 39.0-39.9,adult Z68.39 V85.39    3. Dietary counseling and surveillance Z71.3 V65.3        Nutrition plan: [] LCHF/Ketogenic   [] Modified low-calorie diet (low carb/low-edgard)               [x] Low-calorie diet    []Maintenance       []Other    Exercise: [x]Cardio     [x]Resistance/strength training                       [x]ACSM recommendations (150 minutes/week in active weight loss)                              Behavior: [x]Motivational interviewing performed    [] Referral for counseling                         [x]Discussed strategies to overcome habits/challenges for focus         [] Stress management   [] Stimulus control                    [] Sleep hygiene    Reviewed:  [x] Nutrition and the importance of regular protein intake  [x] Hidden carbohydrate sources  [x] Alcohol use  [x] Tobacco use   [x] Importance of exercise and reducing sedentary time  [x] Proper use of OPTIFAST and side effects       Treatment start date: 4/25/18  16 weeks: 8/25/18-->extend to [facility]  Starting weight: 317 pounds  Total weight loss: 24 pounds (37.5 pounds since initial visit in Jan)     No orders of the defined types were placed in this encounter. No Follow-up on file. This dictation was performed with a verbal recognition program (Dragon) and all efforts were made to ensure accuracy of this dictation. It is possible that there are still dictated errors within this note. If so, please bring any errors to my attention for correction.

## 2018-09-27 NOTE — PATIENT INSTRUCTIONS
reach a healthy weight? Focus on health, not diets. Diets are hard to stay on and don't work in the long run. It is very hard to stay with a diet that includes lots of big changes in your eating habits. Instead of a diet, focus on lifestyle changes that will improve your health and achieve the right balance of energy and calories. To lose weight, you need to burn more calories than you take in. You can do it by eating healthy foods in reasonable amounts and becoming more active, even a little bit every day. Making small changes over time can add up to a lot. Make a plan for change. Many people have found that naming their reasons for change and staying focused on their plan can make a big difference. Work with your doctor to create a plan that is right for you. · Ask yourself: [place] are my personal, most powerful reasons for wanting this change? What will my life look like when I've made the change? \"  · Set your long-term goal. Make it specific, such as \"I will lose x pounds. \"  · Break your long-term goal into smaller, short-term goals. Make these small steps specific and within your reach, things you know you can do. These steps are what keep you going from day to day. Talk with your doctor about other weight-loss options. If you have a BMI in a certain range and have not been able to lose weight with diet and exercise, medicine or surgery may be an option for you. Before your doctor will prescribe medicines or surgery, he or she will probably want you to be more active and follow your healthy eating plan for a period of time. These habits are key lifelong changes for managing your weight, with or without other medical treatment. And these changes can help you avoid weight-related health problems. How can you stay on your plan for change? Be ready. Choose to start during a time when there are few events that might trigger slip-ups, like holidays, social events, and high-stress periods.   Decide on your first few steps. Most people have more success when they make small changes, one step at a time. For example, you might switch a daily candy bar to a piece of fruit, walk 10 minutes more, or add more vegetables to a meal.  Line up your support people. Make sure you're not going to be alone as you make this change. Connect with people who understand how important it is to you. Ask family members and friends for help in keeping with your plan. And think about who could make it harder for you, and how to handle them. Try tracking. People who keep track of what they eat, feel, and do are better at losing weight. Try writing down things like:  · What and how much you eat. · How you feel before and after each meal.  · Details about each meal (like eating out or at home, eating alone, or with friends or family). · What you do to be active. Look and plan. As you track, look for patterns that you may want to change. Take note of:  · When you eat and whether you skip meals. · How often you eat out. · How many fruits and vegetables you eat. · When you eat beyond feeling full. · When and why you eat for reasons other than being hungry. When you stray from your plan, don't get upset. Figure out what made you slip up and how you can fix it. Can you take medicines or have surgery to lose weight? If you have a BMI in a certain range and have not been able to lose weight with diet and exercise, medicine or surgery may be an option for you. If you have a BMI of at least 30.0 (or a BMI of at least 27.0 and another health problem related to your weight), ask your doctor about weight-loss medicines. They work by making you feel less hungry, making you feel full more quickly, or changing how you digest fat. Medicines are used along with diet changes and more physical activity to help you make lasting changes.   If you have a BMI of 40.0 or more (or a BMI of 35.0 or more and another health problem related to your weight), your doctor

## 2018-10-01 NOTE — PROGRESS NOTES
Chief Complaint    Follow-up (left knee check up)      History of Present Illness:  Neo Pierce is a 57 y.o. male who presents for follow up of his bilateral knees. Patient has known patellofemoral arthrosis of the bilateral knees and he's been on the conservative management. He is been part of a weight loss program with Dr. Barfield Patch continues to lose weight. He reports that the injections that he's been receiving have been helpful however he is had a gradual return of symptoms. He also complains of left knee swelling again. He reports that most of his pain and stiffness occurs when he is bending his knees. He also has trouble with getting up from a seated position and climbing stairs. He rates his pain level today at 2/10 VAS.      Past Medical History:   Diagnosis Date    Arthritis         Past Surgical History:   Procedure Laterality Date    APPENDECTOMY         Family History   Problem Relation Age of Onset    Arthritis Mother     Other Mother         MS    Cancer Father         pancreas    Cancer Maternal Grandmother         colon     Arthritis Maternal Grandmother        Social History     Social History    Marital status: Single     Spouse name: N/A    Number of children: N/A    Years of education: N/A     Social History Main Topics    Smoking status: Current Every Day Smoker     Packs/day: 1.00     Years: 15.00    Smokeless tobacco: Never Used    Alcohol use Yes      Comment: 1 bottle wine a week    Drug use: Yes     Types: Marijuana      Comment: 2x month    Sexual activity: Not Asked     Other Topics Concern    None     Social History Narrative    None       Current Outpatient Prescriptions   Medication Sig Dispense Refill    ferrous sulfate 325 (65 Fe) MG tablet Take 1 tablet by mouth Daily with supper 90 tablet 0    Cholecalciferol (VITAMIN D) 2000 units TABS tablet Take 1 tablet by mouth daily 90 tablet 0    naproxen-diphenhydramine 220-25 MG TABS Take by mouth      patellofemoral crepitus, there is no joint line tenderness    Range of Motion:  0-120    Strength: Motor is grossly intact    Special Tests:   No ligament instability     Gait: antalgic    Alignment:  Varus deformity  Radiology:     X-rays obtained and reviewed in office:  None    AK ARTHROCENTESIS ASPIR&/INJ MAJOR JT/BURSA W/O US  AK METHYLPREDNISOLONE 40 MG INJ  AMB REFERRAL TO ORTHOPEDIC SURGERY      Office Procedures:  Orders Placed This Encounter   Procedures   Elisabeth Ortega MD (orthopedic surgery)     Referral Priority:   Routine     Referral Type:   Consult for Advice and Opinion     Referral Reason:   Specialty Services Required     Referred to Provider:   Nenita Hackett MD     Requested Specialty:   Orthopedic Surgery     Number of Visits Requested:   1    AK ARTHROCENTESIS ASPIR&/INJ MAJOR JT/BURSA W/O US    AK METHYLPREDNISOLONE 40 MG INJ            Assessment: Patient is a 62-year-old gentleman who is morbidly obese with bilateral knee patellofemoral arthrosis which is worse on the left in comparison to the right. Encounter Diagnoses   Name Primary?  Patellofemoral arthritis of right knee Yes    Patellofemoral syndrome of left knee     Knee effusion, left     Flat feet     Left knee pain, unspecified chronicity        Treatment Plan: Today we recommend that he continue with conservative management. We recommend that he continue weight loss modalities. We recommend that he use cryotherapy and over-the-counter anti-inflammatory agents as needed for pain and discomfort and swelling. We will go ahead and proceed to do corticosteroid injections in the bilateral knee. We will also aspirate his left knee. After informed consent was provided, the patient was seated on the exam table with the right knee flexed to 90 degrees. The anterolateral aspect of the knee adjacent to the joint line was prepped with Chlora-prep.  The skin and subcutaneous tissues were anesthetized with ethyl

## 2018-11-06 ENCOUNTER — OFFICE VISIT (OUTPATIENT)
Dept: BARIATRICS/WEIGHT MGMT | Age: 48
End: 2018-11-06
Payer: MEDICAID

## 2018-11-06 VITALS
HEART RATE: 86 BPM | SYSTOLIC BLOOD PRESSURE: 124 MMHG | WEIGHT: 291.5 LBS | DIASTOLIC BLOOD PRESSURE: 80 MMHG | HEIGHT: 72 IN | BODY MASS INDEX: 39.48 KG/M2

## 2018-11-06 DIAGNOSIS — E66.9 CLASS 2 OBESITY: Primary | ICD-10-CM

## 2018-11-06 DIAGNOSIS — Z71.3 DIETARY COUNSELING AND SURVEILLANCE: ICD-10-CM

## 2018-11-06 PROCEDURE — G8417 CALC BMI ABV UP PARAM F/U: HCPCS | Performed by: FAMILY MEDICINE

## 2018-11-06 PROCEDURE — G8484 FLU IMMUNIZE NO ADMIN: HCPCS | Performed by: FAMILY MEDICINE

## 2018-11-06 PROCEDURE — 4004F PT TOBACCO SCREEN RCVD TLK: CPT | Performed by: FAMILY MEDICINE

## 2018-11-06 PROCEDURE — G8427 DOCREV CUR MEDS BY ELIG CLIN: HCPCS | Performed by: FAMILY MEDICINE

## 2018-11-06 PROCEDURE — 99213 OFFICE O/P EST LOW 20 MIN: CPT | Performed by: FAMILY MEDICINE

## 2018-11-06 ASSESSMENT — ENCOUNTER SYMPTOMS
GASTROINTESTINAL NEGATIVE: 1
RESPIRATORY NEGATIVE: 1

## 2018-11-06 NOTE — PROGRESS NOTES
Patient: Valente Mejia                      Encounter Date: 11/6/2018    YOB: 1970               Age: 57 y.o. Chief Complaint   Patient presents with    Weight Management     9th MWM, optifast 4x       /80   Pulse 86   Ht 6' (1.829 m)   Wt 291 lb 8 oz (132.2 kg)   BMI 39.53 kg/m²     Body mass index is 39.53 kg/m². HPI: 57 y.o. male with a long-standing history of obesity presents today for 6-week follow-up. He has lost 2 pounds since his last visit. Current treatment includes OPTIFAST 4:1 meal plan. Met with the dietitian today. Food recall and assessment reviewed. Hasn't been able to follow the meal plan. Under a lot of stress. Taking care of ill mother. Off of his routine. Sometimes skipping meals due to busy schedule. Doesn't have time to exercise. Wants to use OPTIFAST on a prn basis during this time. Not sure if he can fully commit to the meal plan at this time. Goal is to at least maintain his weight for now. Diet: []LCHF/Ketogenic []Modified low-calorie/low carb diet  [x]Low-calorie diet          []Maintenance       []Other:         Adherent?  []Yes     [x]No         Allergies   Allergen Reactions    Other Other (See Comments)     cats         Current Outpatient Prescriptions:     ferrous sulfate 325 (65 Fe) MG tablet, Take 1 tablet by mouth Daily with supper, Disp: 90 tablet, Rfl: 0    Cholecalciferol (VITAMIN D) 2000 units TABS tablet, Take 1 tablet by mouth daily, Disp: 90 tablet, Rfl: 0    naproxen-diphenhydramine 220-25 MG TABS, Take by mouth, Disp: , Rfl:     azelastine (ASTELIN) 0.1 % nasal spray, 1 spray by Nasal route 2 times daily Use in each nostril as directed, Disp: , Rfl:     Fluticasone Propionate (FLONASE ALLERGY RELIEF NA), by Nasal route, Disp: , Rfl:     Patient Active Problem List   Diagnosis    Patellofemoral syndrome of left knee    Knee effusion, left    Flat feet    Left knee pain    Pes planus of both feet    Patellofemoral arthritis of right knee    Right knee pain    Morbid obesity with BMI of 40.0-44.9, adult (HCC)    Obstructive sleep apnea    Mixed hyperlipidemia    Vitamin D deficiency    Prediabetes    Iron deficiency    Contusion of right great toe without damage to nail       Review of Systems   Constitutional: Negative. Respiratory: Negative. Cardiovascular: Negative. Gastrointestinal: Negative. Musculoskeletal: Negative. Physical Exam   Constitutional: He is oriented to person, place, and time. He appears well-developed and well-nourished. Neurological: He is alert and oriented to person, place, and time. Skin: Skin is warm and dry. Psychiatric: He has a normal mood and affect. His behavior is normal. Thought content normal.       Orders Only on 03/26/2018   Component Date Value Ref Range Status    Sodium 03/26/2018 146* 136 - 145 mmol/L Final    Potassium 03/26/2018 4.5  3.5 - 5.1 mmol/L Final    Chloride 03/26/2018 104  99 - 110 mmol/L Final    CO2 03/26/2018 25  21 - 32 mmol/L Final    Anion Gap 03/26/2018 17* 3 - 16 Final    Glucose 03/26/2018 96  70 - 99 mg/dL Final    BUN 03/26/2018 13  7 - 20 mg/dL Final    CREATININE 03/26/2018 1.1  0.9 - 1.3 mg/dL Final    GFR Non- 03/26/2018 >60  >60 Final    Comment: >60 mL/min/1.73m2 EGFR, calc. for ages 25 and older using the  MDRD formula (not corrected for weight), is valid for stable  renal function.  GFR  03/26/2018 >60  >60 Final    Comment: Chronic Kidney Disease: less than 60 ml/min/1.73 sq.m. Kidney Failure: less than 15 ml/min/1.73 sq.m. Results valid for patients 18 years and older.       Calcium 03/26/2018 9.2  8.3 - 10.6 mg/dL Final    Total Protein 03/26/2018 6.8  6.4 - 8.2 g/dL Final    Alb 03/26/2018 4.3  3.4 - 5.0 g/dL Final    Albumin/Globulin Ratio 03/26/2018 1.7  1.1 - 2.2 Final    Total Bilirubin 03/26/2018 0.5  0.0 - 1.0 mg/dL Final    Alkaline Phosphatase 03/26/2018 78  40 - 129 U/L Final    ALT 03/26/2018 31  10 - 40 U/L Final    AST 03/26/2018 17  15 - 37 U/L Final    Globulin 03/26/2018 2.5  g/dL Final    Hemoglobin A1C 03/26/2018 6.0  See comment % Final    Comment: Comment:  Diagnosis of Diabetes: > or = 6.5%  Increased risk of diabetes (Prediabetes): 5.7-6.4%  Glycemic Control: Nonpregnant Adults: <7.0%                    Pregnant: <6.0%        eAG 03/26/2018 125.5  mg/dL Final    Cholesterol, Total 03/26/2018 195  0 - 199 mg/dL Final    Triglycerides 03/26/2018 130  0 - 150 mg/dL Final    HDL 03/26/2018 53  40 - 60 mg/dL Final    LDL Calculated 03/26/2018 116* <100 mg/dL Final    VLDL Cholesterol Calculated 03/26/2018 26  Not Established mg/dL Final    TSH 03/26/2018 1.92  0.27 - 4.20 uIU/mL Final    Vitamin B-12 03/26/2018 693  211 - 911 pg/mL Final    Folate 03/26/2018 5.75  4.78 - 24.20 ng/mL Final    Comment: Effective 11-15-16 10:00am EST  Please note reference ranges have  changed for Folate.  Vit D, 25-Hydroxy 03/26/2018 36.3  >=30 ng/mL Final    Comment: <=20 ng/mL. ........... Jolaine Shirley Deficient  21-29 ng/mL. ......... Jolaine Shirley Insufficient  >=30 ng/mL. ........ Jolaine Shirley Sufficient      WBC 03/26/2018 7.3  4.0 - 11.0 K/uL Final    RBC 03/26/2018 4.70  4.20 - 5.90 M/uL Final    Hemoglobin 03/26/2018 15.2  13.5 - 17.5 g/dL Final    Hematocrit 03/26/2018 45.1  40.5 - 52.5 % Final    MCV 03/26/2018 96.1  80.0 - 100.0 fL Final    MCH 03/26/2018 32.4  26.0 - 34.0 pg Final    MCHC 03/26/2018 33.7  31.0 - 36.0 g/dL Final    RDW 03/26/2018 14.2  12.4 - 15.4 % Final    Platelets 41/36/0978 203  135 - 450 K/uL Final    MPV 03/26/2018 10.8* 5.0 - 10.5 fL Final    Neutrophils % 03/26/2018 63.8  % Final    Lymphocytes % 03/26/2018 26.1  % Final    Monocytes % 03/26/2018 7.4  % Final    Eosinophils % 03/26/2018 1.9  % Final    Basophils % 03/26/2018 0.8  % Final    Neutrophils # 03/26/2018 4.7  1.7 - 7.7 K/uL Final    Lymphocytes # 03/26/2018 1.9  1.0 - 5.1

## 2018-12-14 ENCOUNTER — OFFICE VISIT (OUTPATIENT)
Dept: BARIATRICS/WEIGHT MGMT | Age: 48
End: 2018-12-14
Payer: MEDICAID

## 2018-12-14 VITALS
DIASTOLIC BLOOD PRESSURE: 86 MMHG | HEART RATE: 84 BPM | BODY MASS INDEX: 39.87 KG/M2 | SYSTOLIC BLOOD PRESSURE: 130 MMHG | WEIGHT: 294 LBS

## 2018-12-14 DIAGNOSIS — E66.9 CLASS 2 OBESITY: Primary | ICD-10-CM

## 2018-12-14 DIAGNOSIS — Z71.3 DIETARY COUNSELING AND SURVEILLANCE: ICD-10-CM

## 2018-12-14 PROCEDURE — 99213 OFFICE O/P EST LOW 20 MIN: CPT | Performed by: FAMILY MEDICINE

## 2018-12-14 PROCEDURE — 4004F PT TOBACCO SCREEN RCVD TLK: CPT | Performed by: FAMILY MEDICINE

## 2018-12-14 PROCEDURE — G8417 CALC BMI ABV UP PARAM F/U: HCPCS | Performed by: FAMILY MEDICINE

## 2018-12-14 PROCEDURE — G8484 FLU IMMUNIZE NO ADMIN: HCPCS | Performed by: FAMILY MEDICINE

## 2018-12-14 PROCEDURE — G8427 DOCREV CUR MEDS BY ELIG CLIN: HCPCS | Performed by: FAMILY MEDICINE

## 2018-12-14 ASSESSMENT — ENCOUNTER SYMPTOMS
GASTROINTESTINAL NEGATIVE: 1
RESPIRATORY NEGATIVE: 1
EYES NEGATIVE: 1

## 2018-12-14 NOTE — PATIENT INSTRUCTIONS
Patient Education        Learning About Obesity  What is obesity? Obesity means having so much body fat that your health is in danger. Having too much body fat can lead to type 2 diabetes, heart disease, high blood pressure, arthritis, sleep apnea, and stroke. Even if you don't feel bad now, think about these health risks. Do they seem like a good reason to start on a new path toward a healthier weight? Or do you have another personal, powerful reason for wanting to lose weight? Whatever it is, keep it in mind. It can be hard to change eating habits and exercise habits. But with your own reason and plan, you can do it. How do you know if your weight is in the obesity range? To know if your weight is in the obesity range, your doctor looks at your body mass index (BMI) and waist size. Your BMI is a number that is calculated from your weight and your height. To figure your BMI for yourself, get a BMI table from your doctor or use an online tool, such as http://www.holman.com/ on the [employer] of [employer]. A healthy BMI is from 18.5 to 24.9. If your BMI is from 30.0 to 39.9, you are considered to have obesity. If your BMI is over 40.0, you are considered to have extreme obesity. What causes obesity? When you take in more calories than you burn off, you gain weight. How you eat, how active you are, and other things affect how your body uses calories and whether you gain weight. If you have family members who have too much body fat, you may have inherited a tendency to gain weight. And your family also helps form your eating and lifestyle habits, which can lead to obesity. Also, our busy lives make it harder to plan and cook healthy meals. For many of us, it's easier to reach for prepared foods, go out to eat, or go to the drive-through. But these foods are often high in saturated fat and calories. Portions are often too large.   What can you do to may talk with you about surgery. Weight-loss surgery has risks, and you will need to work with your doctor to compare the risk of having obesity with the risks of surgery. With any option you choose, you will still need to eat a healthy diet and get regular exercise. Follow-up care is a key part of your treatment and safety. Be sure to make and go to all appointments, and call your doctor if you are having problems. It's also a good idea to know your test results and keep a list of the medicines you take. Where can you learn more? Go to [email or web address] org and sign in to your [employer] account. Enter N111 in the Thrasos box to learn more about \"Learning About Obesity. \"     If you do not have an account, please click on the \"Sign Up Now\" link. Current as of: June 26, 2018  Content Version: 11.8  © 1282-1508 Healthwise, Incorporated. Care instructions adapted under license by [facility] ([place] [facility]). If you have questions about a medical condition or this instruction, always ask your healthcare professional. [address] any warranty or liability for your use of this information.        Plan/Goals:   - Use 3 products/day  - Avoid bread/starches  - Be consistent w/ exercise

## 2019-01-17 ENCOUNTER — OFFICE VISIT (OUTPATIENT)
Dept: BARIATRICS/WEIGHT MGMT | Age: 49
End: 2019-01-17
Payer: MEDICAID

## 2019-01-17 VITALS
DIASTOLIC BLOOD PRESSURE: 88 MMHG | SYSTOLIC BLOOD PRESSURE: 138 MMHG | BODY MASS INDEX: 39.82 KG/M2 | HEIGHT: 72 IN | WEIGHT: 294 LBS | HEART RATE: 88 BPM

## 2019-01-17 DIAGNOSIS — E66.9 CLASS 2 OBESITY: Primary | ICD-10-CM

## 2019-01-17 DIAGNOSIS — Z71.3 DIETARY COUNSELING AND SURVEILLANCE: ICD-10-CM

## 2019-01-17 PROCEDURE — G8417 CALC BMI ABV UP PARAM F/U: HCPCS | Performed by: FAMILY MEDICINE

## 2019-01-17 PROCEDURE — 4004F PT TOBACCO SCREEN RCVD TLK: CPT | Performed by: FAMILY MEDICINE

## 2019-01-17 PROCEDURE — G8427 DOCREV CUR MEDS BY ELIG CLIN: HCPCS | Performed by: FAMILY MEDICINE

## 2019-01-17 PROCEDURE — G8484 FLU IMMUNIZE NO ADMIN: HCPCS | Performed by: FAMILY MEDICINE

## 2019-01-17 PROCEDURE — 99213 OFFICE O/P EST LOW 20 MIN: CPT | Performed by: FAMILY MEDICINE

## 2019-01-17 ASSESSMENT — ENCOUNTER SYMPTOMS
GASTROINTESTINAL NEGATIVE: 1
EYES NEGATIVE: 1
RESPIRATORY NEGATIVE: 1

## 2019-02-22 ENCOUNTER — OFFICE VISIT (OUTPATIENT)
Dept: BARIATRICS/WEIGHT MGMT | Age: 49
End: 2019-02-22
Payer: MEDICAID

## 2019-02-22 VITALS
OXYGEN SATURATION: 99 % | HEART RATE: 86 BPM | HEIGHT: 72 IN | WEIGHT: 290 LBS | RESPIRATION RATE: 16 BRPM | SYSTOLIC BLOOD PRESSURE: 130 MMHG | DIASTOLIC BLOOD PRESSURE: 86 MMHG | BODY MASS INDEX: 39.28 KG/M2

## 2019-02-22 DIAGNOSIS — E66.9 OBESITY (BMI 30-39.9): Primary | ICD-10-CM

## 2019-02-22 PROCEDURE — G8417 CALC BMI ABV UP PARAM F/U: HCPCS | Performed by: NURSE PRACTITIONER

## 2019-02-22 PROCEDURE — G8484 FLU IMMUNIZE NO ADMIN: HCPCS | Performed by: NURSE PRACTITIONER

## 2019-02-22 PROCEDURE — 99213 OFFICE O/P EST LOW 20 MIN: CPT | Performed by: NURSE PRACTITIONER

## 2019-02-22 PROCEDURE — G8427 DOCREV CUR MEDS BY ELIG CLIN: HCPCS | Performed by: NURSE PRACTITIONER

## 2019-02-22 PROCEDURE — 4004F PT TOBACCO SCREEN RCVD TLK: CPT | Performed by: NURSE PRACTITIONER

## 2019-02-22 ASSESSMENT — ENCOUNTER SYMPTOMS
GASTROINTESTINAL NEGATIVE: 1
RESPIRATORY NEGATIVE: 1

## 2019-03-11 ENCOUNTER — OFFICE VISIT (OUTPATIENT)
Dept: ORTHOPEDIC SURGERY | Age: 49
End: 2019-03-11
Payer: MEDICAID

## 2019-03-11 VITALS
HEIGHT: 72 IN | BODY MASS INDEX: 39.42 KG/M2 | SYSTOLIC BLOOD PRESSURE: 140 MMHG | DIASTOLIC BLOOD PRESSURE: 90 MMHG | WEIGHT: 291 LBS | HEART RATE: 86 BPM

## 2019-03-11 DIAGNOSIS — M22.2X2 PATELLOFEMORAL SYNDROME OF LEFT KNEE: ICD-10-CM

## 2019-03-11 DIAGNOSIS — M23.300 DERANGEMENT OF LATERAL MENISCUS OF RIGHT KNEE: ICD-10-CM

## 2019-03-11 DIAGNOSIS — M25.462 KNEE EFFUSION, LEFT: ICD-10-CM

## 2019-03-11 DIAGNOSIS — M25.561 RIGHT KNEE PAIN, UNSPECIFIED CHRONICITY: ICD-10-CM

## 2019-03-11 DIAGNOSIS — M25.562 LEFT KNEE PAIN, UNSPECIFIED CHRONICITY: Primary | ICD-10-CM

## 2019-03-11 DIAGNOSIS — M17.11 PATELLOFEMORAL ARTHRITIS OF RIGHT KNEE: ICD-10-CM

## 2019-03-11 PROCEDURE — G8417 CALC BMI ABV UP PARAM F/U: HCPCS | Performed by: PHYSICIAN ASSISTANT

## 2019-03-11 PROCEDURE — G8484 FLU IMMUNIZE NO ADMIN: HCPCS | Performed by: PHYSICIAN ASSISTANT

## 2019-03-11 PROCEDURE — G8427 DOCREV CUR MEDS BY ELIG CLIN: HCPCS | Performed by: PHYSICIAN ASSISTANT

## 2019-03-11 PROCEDURE — 99214 OFFICE O/P EST MOD 30 MIN: CPT | Performed by: PHYSICIAN ASSISTANT

## 2019-03-11 PROCEDURE — 20610 DRAIN/INJ JOINT/BURSA W/O US: CPT | Performed by: PHYSICIAN ASSISTANT

## 2019-03-11 PROCEDURE — 4004F PT TOBACCO SCREEN RCVD TLK: CPT | Performed by: PHYSICIAN ASSISTANT

## 2019-03-11 ASSESSMENT — ENCOUNTER SYMPTOMS: SINUS PAIN: 1

## 2019-03-11 NOTE — PROGRESS NOTES
Depo-Medrol:  NDC: [id number]  Lot #: [id number]  Expiration Date: 1/2021    LIDOCAINE  NDC: 0555-3452-23  LOT: [id number]  EXP: 6/2020    Lt.  Knee

## 2019-03-15 ENCOUNTER — OFFICE VISIT (OUTPATIENT)
Dept: BARIATRICS/WEIGHT MGMT | Age: 49
End: 2019-03-15
Payer: MEDICAID

## 2019-03-15 VITALS
WEIGHT: 294 LBS | BODY MASS INDEX: 39.82 KG/M2 | HEART RATE: 77 BPM | HEIGHT: 72 IN | DIASTOLIC BLOOD PRESSURE: 88 MMHG | SYSTOLIC BLOOD PRESSURE: 128 MMHG

## 2019-03-15 DIAGNOSIS — E66.9 OBESITY (BMI 30-39.9): Primary | ICD-10-CM

## 2019-03-15 PROCEDURE — G8484 FLU IMMUNIZE NO ADMIN: HCPCS | Performed by: NURSE PRACTITIONER

## 2019-03-15 PROCEDURE — 4004F PT TOBACCO SCREEN RCVD TLK: CPT | Performed by: NURSE PRACTITIONER

## 2019-03-15 PROCEDURE — G8417 CALC BMI ABV UP PARAM F/U: HCPCS | Performed by: NURSE PRACTITIONER

## 2019-03-15 PROCEDURE — G8427 DOCREV CUR MEDS BY ELIG CLIN: HCPCS | Performed by: NURSE PRACTITIONER

## 2019-03-15 PROCEDURE — 99213 OFFICE O/P EST LOW 20 MIN: CPT | Performed by: NURSE PRACTITIONER

## 2019-03-15 ASSESSMENT — ENCOUNTER SYMPTOMS
GASTROINTESTINAL NEGATIVE: 1
RESPIRATORY NEGATIVE: 1

## 2019-04-05 NOTE — PROGRESS NOTES
Chief Complaint    Knee Pain (Bilateral knee)      History of Present Illness:  Hina Nance is a 57 y.o. male who presents for follow up of his bilateral knee. Patient has known patellofemoral syndrome and patellofemoral arthritis of the bilateral knees. He has been treated conservatively for many years. Patient has been advised to lose weight to off load the pressures on the knees. Patient has been trying to do so. He reports that his symptoms have gradually returned and he started to have pain especially when he is climbing stairs or doing any deep bands with the knees. He has received corticosteroid injections in the past as well as aspirations which have both been helpful for the patient. Of note he has noticed a locking type mechanism that it is present over the lateral aspect of the right knee. it can be quite painful when it occurs. He does report that he has to modify his knee motion to get it to bend again. He denies any new injuries to his knees.        Past Medical History:   Diagnosis Date    Arthritis         Past Surgical History:   Procedure Laterality Date    APPENDECTOMY         Family History   Problem Relation Age of Onset    Arthritis Mother     Other Mother         MS    Cancer Father         pancreas    Cancer Maternal Grandmother         colon     Arthritis Maternal Grandmother        Social History     Socioeconomic History    Marital status: Single     Spouse name: None    Number of children: None    Years of education: None    Highest education level: None   Occupational History    None   Social Needs    Financial resource strain: None    Food insecurity:     Worry: None     Inability: None    Transportation needs:     Medical: None     Non-medical: None   Tobacco Use    Smoking status: Current Every Day Smoker     Packs/day: 1.00     Years: 15.00     Pack years: 15.00    Smokeless tobacco: Never Used    Tobacco comment: cutting back   Substance and Sexual Activity    Alcohol use: Yes     Comment:  a few glasses of wine a week    Drug use: Yes     Types: Marijuana     Comment: 2x month    Sexual activity: None   Lifestyle    Physical activity:     Days per week: None     Minutes per session: None    Stress: None   Relationships    Social connections:     Talks on phone: None     Gets together: None     Attends Anabaptist service: None     Active member of club or organization: None     Attends meetings of clubs or organizations: None     Relationship status: None    Intimate partner violence:     Fear of current or ex partner: None     Emotionally abused: None     Physically abused: None     Forced sexual activity: None   Other Topics Concern    None   Social History Narrative    None       Current Outpatient Medications   Medication Sig Dispense Refill    ferrous sulfate 325 (65 Fe) MG tablet Take 1 tablet by mouth Daily with supper 90 tablet 0    Cholecalciferol (VITAMIN D) 2000 units TABS tablet Take 1 tablet by mouth daily 90 tablet 0    naproxen-diphenhydramine 220-25 MG TABS Take by mouth      azelastine (ASTELIN) 0.1 % nasal spray 1 spray by Nasal route 2 times daily Use in each nostril as directed      Fluticasone Propionate (FLONASE ALLERGY RELIEF NA) by Nasal route       No current facility-administered medications for this visit. Allergies   Allergen Reactions    Other Other (See Comments)     cats       Review of Systems:  A 14 point review of systems was completed by the patient and is available in the media section of the scanned medical record and was reviewed on 4/5/2019. The review is negative with the exception of those things mentioned in the HPI and Past Medical History     Vital Signs:   BP (!) 140/90   Pulse 86   Ht 6' 0.01\" (1.829 m)   Wt 291 lb (132 kg)   BMI 39.46 kg/m²     General Exam:   Mental Status: The patient is oriented to time, place and person. The patient's mood and affect are appropriate.   Neurological: The patient has good coordination. There is no weakness or sensory deficit. Knee Examination:right    Skin:  There are no skin lesions, cellulitis, or extreme edema in the lower extremities. Sensation is grossly intact to light touch bilaterally lower extremity. The patient has warm and well-perfused Bilateral lower extremities with brisk capillary refill. Inspection:  Mild effusion,  no gross deformities, no signs of infection. Palpation: There is patellofemoral crepitus, there is lateral joint line tenderness    Range of Motion:  0-90    Strength: Motor is grossly intact    Special Tests:  Positive Taj's and hyperflexion test. No ligament instability     Gait:  antalgic    Alignment:  Varus deformity    Knee Examination:left    Skin:  There are no skin lesions, cellulitis, or extreme edema in the lower extremities. Sensation is grossly intact to light touch bilaterally lower extremity. The patient has warm and well-perfused Bilateral lower extremities with brisk capillary refill. Inspection:  Mild effusion,  no gross deformities, no signs of infection. Palpation: there is a catch and at pop that can be felt with deep flexion of his right knee. There is patellofemoral crepitus, there is no joint line tenderness    Range of Motion:  0-120    Strength: Motor is grossly intact    Special Tests:   No ligament instability     Gait:  antalgic    Alignment:  Varus deformity    Radiology:     X-rays obtained and reviewed in office: he had x-rays of the bilateral knees that were obtained and reviewed in office today. Did obtain the Mozelle Love and lateral views. There is narrowing and degenerative changes in the patellofemoral compartment. He has a fairly well preserved tibiofemoral compartment bilaterally. There is no fractures or dislocations seen.   XR KNEE LEFT (3 VIEWS)  XR KNEE RIGHT (3 VIEWS)  MRI KNEE RIGHT WO CONTRAST  OR ARTHROCENTESIS ASPIR&/INJ MAJOR JT/BURSA W/O US  OR METHYLPREDNISOLONE 40 MG INJ      Office Procedures:  Orders Placed This Encounter   Procedures    XR KNEE LEFT (3 VIEWS)     Order Specific Question:   Reason for exam:     Answer:   Pain RM 3    XR KNEE RIGHT (3 VIEWS)     Order Specific Question:   Reason for exam:     Answer:   Pain RM 3    MRI Knee Right WO Contrast     Order Specific Question:   Reason for exam:     Answer:   Right Knee Pain    WV ARTHROCENTESIS ASPIR&/INJ MAJOR JT/BURSA W/O US    W[place] METHYLPREDNISOLONE 40 MG INJ     Depo-Medrol 40mg with 1% Lidocaine            Assessment: patient is a 59-year-old gentleman with bilateral knee patellofemoral arthrosis currently with a new onset of mechanical symptoms in the right knee indicating a possible meniscal derangement. Encounter Diagnoses   Name Primary?  Left knee pain, unspecified chronicity Yes    Patellofemoral syndrome of left knee     Knee effusion, left     Right knee pain, unspecified chronicity     Patellofemoral arthritis of right knee     Derangement of lateral meniscus of right knee        Treatment Plan:  He was right knee we will obtain an MRI of the right knee to evaluate for any meniscus tears due to the mechanical symptoms that he is experiencing. Additionally we recommend that he have a corticosteroid injection performed in the left knee to alleviate some of his symptoms and inflammation. Overall he was advised on weight loss modalities as well as the use of ice, rest and elevation. All this is discussed in full detail with the patient today. We we will have him follow with Dr. Rainer Mirza following the MRI. After informed consent was provided, the patient was seated on the exam table with the left knee flexed to 90 degrees. The anterolateral aspect of the knee adjacent to the joint line was prepped with Chlora-prep. The skin and subcutaneous tissues were anesthetized with ethyl chloride spray.  A 22-gauge needle was inserted into the left knee and 2 ml of 40 mg/ml DepoMedrol was injected. The needle was withdrawn and the puncture site sealed with a Band-Aid. The patient tolerated the procedure well. Post injection instructions given and any problems to notify us. Follow up in: prn    1:56 PM      Marilou Briones, [address] [facility] Physician Assistant      This dictation was performed with a verbal recognition program [facility]) and it was checked for errors. It is possible that there are still dictated errors within this office note. If so, please bring any errors to my attention for an addendum. All efforts were made to ensure that this office note is accurate.

## 2019-04-09 ENCOUNTER — OFFICE VISIT (OUTPATIENT)
Dept: ORTHOPEDIC SURGERY | Age: 49
End: 2019-04-09
Payer: MEDICAID

## 2019-04-09 VITALS
SYSTOLIC BLOOD PRESSURE: 121 MMHG | BODY MASS INDEX: 39.83 KG/M2 | HEIGHT: 72 IN | WEIGHT: 294.09 LBS | HEART RATE: 101 BPM | DIASTOLIC BLOOD PRESSURE: 82 MMHG

## 2019-04-09 DIAGNOSIS — M17.10 PATELLOFEMORAL ARTHRITIS: ICD-10-CM

## 2019-04-09 DIAGNOSIS — S83.271D COMPLEX TEAR OF LATERAL MENISCUS OF RIGHT KNEE AS CURRENT INJURY, SUBSEQUENT ENCOUNTER: Primary | ICD-10-CM

## 2019-04-09 PROCEDURE — G8427 DOCREV CUR MEDS BY ELIG CLIN: HCPCS | Performed by: ORTHOPAEDIC SURGERY

## 2019-04-09 PROCEDURE — 99213 OFFICE O/P EST LOW 20 MIN: CPT | Performed by: ORTHOPAEDIC SURGERY

## 2019-04-09 PROCEDURE — G8417 CALC BMI ABV UP PARAM F/U: HCPCS | Performed by: ORTHOPAEDIC SURGERY

## 2019-04-09 PROCEDURE — 4004F PT TOBACCO SCREEN RCVD TLK: CPT | Performed by: ORTHOPAEDIC SURGERY

## 2019-04-09 NOTE — PROGRESS NOTES
Chief Complaint    Follow-up (MRI right knee )      History of Present Illness:  Gauri Eddy is a 57 y.o. male who presents for follow up of his right knee. The patient is here for an MRI follow up. Patient has known patellofemoral syndrome and patellofemoral arthritis of the bilateral knees. He has been treated conservatively for many years. Patient has been advised to lose weight to off load the pressures on the knees. Patient has been trying to do so. He reports that his symptoms have gradually returned and he started to have pain especially when he is climbing stairs or doing any deep bands with the knees. He has received corticosteroid injections in the past as well as aspirations which have both been helpful for the patient. Of note he has noticed a locking type mechanism that it is present over the lateral aspect of the right knee. It can be quite painful when it occurs. He does report that he has to modify his knee motion to get it to bend again. Since his last visit with [facility] he notes the locking seems to be occurring more frequently. He denies any new injuries to his knees.        Past Medical History:   Diagnosis Date    Arthritis         Past Surgical History:   Procedure Laterality Date    APPENDECTOMY         Family History   Problem Relation Age of Onset    Arthritis Mother     Other Mother         MS    Cancer Father         pancreas    Cancer Maternal Grandmother         colon     Arthritis Maternal Grandmother        Social History     Socioeconomic History    Marital status: Single     Spouse name: None    Number of children: None    Years of education: None    Highest education level: None   Occupational History    None   Social Needs    Financial resource strain: None    Food insecurity:     Worry: None     Inability: None    Transportation needs:     Medical: None     Non-medical: None   Tobacco Use    Smoking status: Current Every Day Smoker     Packs/day: 1.00 Years: 15.00     Pack years: 15.00    Smokeless tobacco: Never Used    Tobacco comment: cutting back   Substance and Sexual Activity    Alcohol use: Yes     Comment:  a few glasses of wine a week    Drug use: Yes     Types: Marijuana     Comment: 2x month    Sexual activity: None   Lifestyle    Physical activity:     Days per week: None     Minutes per session: None    Stress: None   Relationships    Social connections:     Talks on phone: None     Gets together: None     Attends [employer] service: None     Active member of club or organization: None     Attends meetings of clubs or organizations: None     Relationship status: None    Intimate partner violence:     Fear of current or ex partner: None     Emotionally abused: None     Physically abused: None     Forced sexual activity: None   Other Topics Concern    None   Social History Narrative    None       Current Outpatient Medications   Medication Sig Dispense Refill    ferrous sulfate 325 (65 Fe) MG tablet Take 1 tablet by mouth Daily with supper 90 tablet 0    Cholecalciferol (VITAMIN D) 2000 units TABS tablet Take 1 tablet by mouth daily 90 tablet 0    naproxen-diphenhydramine 220-25 MG TABS Take by mouth      azelastine (ASTELIN) 0.1 % nasal spray 1 spray by Nasal route 2 times daily Use in each nostril as directed      Fluticasone Propionate (FLONASE ALLERGY RELIEF NA) by Nasal route       No current facility-administered medications for this visit. Allergies   Allergen Reactions    Other Other (See Comments)     cats       Review of Systems:  A 14 point review of systems was completed by the patient and is available in the media section of the scanned medical record and was reviewed on 4/9/2019.   The review is negative with the exception of those things mentioned in the HPI and Past Medical History     Vital Signs:   /82   Pulse 101   Ht 6' 0.01\" (1.829 m)   Wt 294 lb 1.5 oz (133.4 kg)   BMI 39.88 kg/m²     General Exam: Mental Status: The patient is oriented to time, place and person. The patient's mood and affect are appropriate. Neurological: The patient has good coordination. There is no weakness or sensory deficit. Knee Examination: RIGHT     Skin:  There are no skin lesions, cellulitis, or extreme edema in the lower extremities. Sensation is grossly intact to light touch bilaterally lower extremity. The patient has warm and well-perfused Bilateral lower extremities with brisk capillary refill.      Inspection:  Mild effusion,  no gross deformities, no signs of infection.      Palpation: There is patellofemoral crepitus, there is lateral joint line tenderness     Range of Motion:  0-90     Strength: Motor is grossly intact     Special Tests:  Positive Taj's and hyperflexion test. No ligament instability      Gait:  antalgic     Alignment:  Varus deformity      Radiology:     None taken today.     MRI RESULTS:  FINDINGS:  The distal quadriceps tendon and patellar tendon appear intact.  Moderate thinning    of the patellar articular cartilage is observed compatible with chondromalacia.  Mild patella    connie is present.       A small knee joint effusion is observed with synovial thickening involving the suprapatellar    recess.  There is no evidence of Baker cyst.       The anterior and posterior cruciate ligaments appear intact.  A loose body or synovial    osteochondromatosis lesion measuring approximately 6 mm in diameter extends anterior and    lateral to the distal-anterior cruciate ligament with adjacent synovial thickening.  The medial    collateral ligament is intact.  The fibular collateral ligament, biceps femoris tendon, and    iliotibial band appear intact.       Findings consistent with mild posteromedial meniscal degeneration are observed.  A complex tear    with horizontal and vertical components involves the posterior horn and midbody of the lateral    meniscus.  A horizontal tear also extends into the Follow up pre-opertaively    [unfilled]  12:00 PM      I, Stacy Kramer ATC am scribing for and in the presence of Dr. Cody Gross. The physical examination was performed by Dr. Cody Gross. All counseling during the appointment was performed between the patient and Dr. Cody Gross. 04/09/19 12:00 PM   Stacy Kramer ATC    This dictation was performed with a verbal recognition program (DRAGON) and it was checked for errors. It is possible that there are still dictated errors within this office note. If so, please bring any errors to my attention for an addendum. All efforts were made to ensure that this office note is accurate. This dictation was performed with a verbal recognition program (DRAGON) and it was checked for errors. It is possible that there are still dictated errors within this office note. If so, please bring any errors to my attention for an addendum. All efforts were made to ensure that this office note is accurate. Supervising Physician Attestation:  I, Dr. Cody Gross, personally performed the services described in this documentation as scribed above, and it is both accurate and complete and I agree with all pertinent clinical information. I personally interviewed the patient and performed a physical examination and medical decision making. I discussed the patient's condition and treatment options and have  reviewed and agree with the past medical, family and social history unless otherwise noted. All of the patient's questions were answered.       Board Certified Orthopaedic Surgeon  [address] [facility] and [address] [place] [address] and [facility] and [employer]  Professor of Jass Kunz

## 2019-04-12 PROBLEM — E66.01 SEVERE OBESITY (BMI 35.0-39.9) WITH COMORBIDITY (HCC): Status: ACTIVE | Noted: 2019-04-12

## 2019-04-17 ENCOUNTER — OFFICE VISIT (OUTPATIENT)
Dept: BARIATRICS/WEIGHT MGMT | Age: 49
End: 2019-04-17
Payer: MEDICAID

## 2019-04-17 VITALS
DIASTOLIC BLOOD PRESSURE: 86 MMHG | RESPIRATION RATE: 16 BRPM | HEART RATE: 86 BPM | WEIGHT: 293 LBS | HEIGHT: 72 IN | SYSTOLIC BLOOD PRESSURE: 136 MMHG | BODY MASS INDEX: 39.68 KG/M2

## 2019-04-17 DIAGNOSIS — E66.01 SEVERE OBESITY (BMI 35.0-39.9) WITH COMORBIDITY (HCC): Primary | ICD-10-CM

## 2019-04-17 DIAGNOSIS — E78.2 MIXED HYPERLIPIDEMIA: ICD-10-CM

## 2019-04-17 DIAGNOSIS — G47.33 OBSTRUCTIVE SLEEP APNEA: ICD-10-CM

## 2019-04-17 DIAGNOSIS — R73.03 PREDIABETES: ICD-10-CM

## 2019-04-17 PROCEDURE — G8427 DOCREV CUR MEDS BY ELIG CLIN: HCPCS | Performed by: NURSE PRACTITIONER

## 2019-04-17 PROCEDURE — 99213 OFFICE O/P EST LOW 20 MIN: CPT | Performed by: NURSE PRACTITIONER

## 2019-04-17 PROCEDURE — 4004F PT TOBACCO SCREEN RCVD TLK: CPT | Performed by: NURSE PRACTITIONER

## 2019-04-17 PROCEDURE — G8417 CALC BMI ABV UP PARAM F/U: HCPCS | Performed by: NURSE PRACTITIONER

## 2019-04-17 ASSESSMENT — ENCOUNTER SYMPTOMS
GASTROINTESTINAL NEGATIVE: 1
RESPIRATORY NEGATIVE: 1
EYES NEGATIVE: 1
ALLERGIC/IMMUNOLOGIC NEGATIVE: 1

## 2019-04-17 NOTE — PROGRESS NOTES
[facility]) Physicians   [facility]    Medical Weight Loss    HPI: Clark Archer is a very pleasant 57 y.o. male with Body mass index is 39.74 kg/m². Here for medical weight loss. Patient denies any nausea, vomiting, fevers, chills, shortness of breath, chest pain, cough, constipation or difficulty urinating. Patient is doing Optifast 3:2, but finding it more difficult with his schedule. Past Medical History:   Diagnosis Date    Arthritis      Past Surgical History:   Procedure Laterality Date    APPENDECTOMY       Family History   Problem Relation Age of Onset    Arthritis Mother     Other Mother         MS    Cancer Father         pancreas    Cancer Maternal Grandmother         colon     Arthritis Maternal Grandmother      Social History     Tobacco Use    Smoking status: Current Every Day Smoker     Packs/day: 1.00     Years: 15.00     Pack years: 15.00    Smokeless tobacco: Never Used    Tobacco comment: cutting back   Substance Use Topics    Alcohol use: Yes     Comment:  a few glasses of wine a week     I counseled the patient on the importance of not smoking and risks of ETOH. Allergies   Allergen Reactions    Other Other (See Comments)     cats     Vitals:    04/17/19 1131   BP: 136/86   Pulse: 86   Resp: 16   Weight: 293 lb (132.9 kg)   Height: 6' (1.829 m)     Body mass index is 39.74 kg/m².     Current Outpatient Medications:     Cholecalciferol (VITAMIN D) 2000 units TABS tablet, Take 1 tablet by mouth daily, Disp: 90 tablet, Rfl: 0    naproxen-diphenhydramine 220-25 MG TABS, Take by mouth, Disp: , Rfl:     azelastine (ASTELIN) 0.1 % nasal spray, 1 spray by Nasal route 2 times daily Use in each nostril as directed, Disp: , Rfl:     Fluticasone Propionate (FLONASE ALLERGY RELIEF NA), by Nasal route, Disp: , Rfl:     Lab Results   Component Value Date    WBC 7.3 03/26/2018    RBC 4.70 03/26/2018    HGB 15.2 03/26/2018    HCT 45.1 03/26/2018    MCV 96.1 03/26/2018 Negative. HENT: Negative. Eyes: Negative. Respiratory: Negative. Cardiovascular: Negative. Gastrointestinal: Negative. Endocrine: Negative. Genitourinary: Negative. Musculoskeletal: Negative. Skin: Negative. Allergic/Immunologic: Negative. Neurological: Negative. Hematological: Negative. Psychiatric/Behavioral: Negative. Physical Exam   Constitutional: He is oriented to person, place, and time. He appears well-developed and well-nourished. HENT:   Head: Normocephalic and atraumatic. Eyes: Pupils are equal, round, and reactive to light. Conjunctivae are normal.   Neck: Normal range of motion. Neck supple. Cardiovascular: Normal rate. Pulmonary/Chest: Effort normal.   Abdominal: Soft. Musculoskeletal: Normal range of motion. Neurological: He is alert and oriented to person, place, and time. Skin: Skin is warm and dry. Psychiatric: He has a normal mood and affect. His behavior is normal. Judgment and thought content normal.   Vitals reviewed. Assessment and Plan:    Patient is here for their 14th medical weight loss visit, down 1 lb and a total weight loss of 38 lbs. The patient's current Body mass index is 39.74 kg/m². (4/17/19). He is doing well, making some dietary and behavior modifications. He is not following dietary recommendations. He did meet with the registered dietitian for continued follow up. I agree with recommendations and plan. He is exercising three days per week doing cardio. Encouraged continued physical activity. Patient will be change to 1200 calorie LCMP and protein shakes/bars. We will see him back in 6 weeks for continued follow up. A total of 15 minutes was spent face-to-face with the patient and over half of that time was spent counseling the patient on proper dietary behaviors and exercise.

## 2019-04-17 NOTE — PATIENT INSTRUCTIONS
Key dietary points:     - Meats (preferably organic or grass fed) are great sources of protein and have no carbohydrates. - Recommend coconut, olive, avocado, or almond oils. - When buying dairy, choose regular or full fat options. - Choose vegetables that grow above ground as they are generally lower in carbohydrates and higher in fiber.  - Avoid starches such as bread, rice, potatoes, pasta and all sources of simple sugars (desserts, soda, breakfast cereals). - Choose beverages that are calorie and sugar free.

## 2019-06-13 ENCOUNTER — OFFICE VISIT (OUTPATIENT)
Dept: BARIATRICS/WEIGHT MGMT | Age: 49
End: 2019-06-13
Payer: MEDICAID

## 2019-06-13 VITALS
HEART RATE: 74 BPM | SYSTOLIC BLOOD PRESSURE: 136 MMHG | DIASTOLIC BLOOD PRESSURE: 88 MMHG | RESPIRATION RATE: 16 BRPM | BODY MASS INDEX: 39.52 KG/M2 | WEIGHT: 291.8 LBS | HEIGHT: 72 IN

## 2019-06-13 DIAGNOSIS — E66.01 SEVERE OBESITY (BMI 35.0-39.9) WITH COMORBIDITY (HCC): Primary | ICD-10-CM

## 2019-06-13 PROCEDURE — 4004F PT TOBACCO SCREEN RCVD TLK: CPT | Performed by: NURSE PRACTITIONER

## 2019-06-13 PROCEDURE — G8427 DOCREV CUR MEDS BY ELIG CLIN: HCPCS | Performed by: NURSE PRACTITIONER

## 2019-06-13 PROCEDURE — 99213 OFFICE O/P EST LOW 20 MIN: CPT | Performed by: NURSE PRACTITIONER

## 2019-06-13 PROCEDURE — G8417 CALC BMI ABV UP PARAM F/U: HCPCS | Performed by: NURSE PRACTITIONER

## 2019-06-13 ASSESSMENT — ENCOUNTER SYMPTOMS
RESPIRATORY NEGATIVE: 1
GASTROINTESTINAL NEGATIVE: 1

## 2019-06-13 NOTE — PROGRESS NOTES
[facility]    Patient: Benson Ponce                      Encounter Date: 6/13/2019    YOB: 1970               Age: 57 y.o. Chief Complaint   Patient presents with    Obesity     15th MWM, Diet Only        /88   Pulse 74   Resp 16   Ht 6' (1.829 m)   Wt 291 lb 12.8 oz (132.4 kg)   BMI 39.58 kg/m²     Body mass index is 39.58 kg/m². HPI: 57 y.o. male with a long-standing history of obesity presents today for follow-up. he has lost 1.2 pounds since his last visit . Current treatment includes 1200 calorie low carb diet and exercise. Met with the dietitian today. Plan reviewed in detail with the patient. He is very busy caring for his mother. Feels that he has made a smooth transition off of OPTIFAST and onto meal plan but is not following it every day. Exercising about once a week. Diet: []LCHF/Ketogenic   [x]Modified low-calorie/low carb diet  []Low-calorie diet          []Maintenance       []Other:         Adherent? []Yes     [x]No       Exercise: []Cardio     []Resistance/strength training     [x]Other: No intentional exercise, but trying to be physicallyactive     Allergies   Allergen Reactions    Other Other (See Comments)     cats         Current Outpatient Medications:     Cholecalciferol (VITAMIN D) 2000 units TABS tablet, Take 1 tablet by mouth daily, Disp: 90 tablet, Rfl: 0    naproxen-diphenhydramine 220-25 MG TABS, Take by mouth, Disp: , Rfl:     azelastine (ASTELIN) 0.1 % nasal spray, 1 spray by Nasal route 2 times daily Use in each nostril as directed, Disp: , Rfl:     Fluticasone Propionate (FLONASE ALLERGY RELIEF NA), by Nasal route, Disp: , Rfl:     Patient Active Problem List   Diagnosis    Patellofemoral syndrome of left knee    Knee effusion, left    Flat feet    Left knee pain    Pes planus of both feet    Patellofemoral arthritis of right knee    Right knee pain    Morbid obesity with BMI of 40.0-44.9, adult (HCC)    Obstructive - 8.2 g/dL Final    Alb 03/26/2018 4.3  3.4 - 5.0 g/dL Final    Albumin/Globulin Ratio 03/26/2018 1.7  1.1 - 2.2 Final    Total Bilirubin 03/26/2018 0.5  0.0 - 1.0 mg/dL Final    Alkaline Phosphatase 03/26/2018 78  40 - 129 U/L Final    ALT 03/26/2018 31  10 - 40 U/L Final    AST 03/26/2018 17  15 - 37 U/L Final    Globulin 03/26/2018 2.5  g/dL Final    Hemoglobin A1C 03/26/2018 6.0  See comment % Final    Comment: Comment:  Diagnosis of Diabetes: > or = 6.5%  Increased risk of diabetes (Prediabetes): 5.7-6.4%  Glycemic Control: Nonpregnant Adults: <7.0%                    Pregnant: <6.0%        eAG 03/26/2018 125.5  mg/dL Final    Cholesterol, Total 03/26/2018 195  0 - 199 mg/dL Final    Triglycerides 03/26/2018 130  0 - 150 mg/dL Final    HDL 03/26/2018 53  40 - 60 mg/dL Final    LDL Calculated 03/26/2018 116* <100 mg/dL Final    VLDL Cholesterol Calculated 03/26/2018 26  Not Established mg/dL Final    TSH 03/26/2018 1.92  0.27 - 4.20 uIU/mL Final    Vitamin B-12 03/26/2018 693  211 - 911 pg/mL Final    Folate 03/26/2018 5.75  4.78 - 24.20 ng/mL Final    Comment: Effective 11-15-16 10:00am EST  Please note reference ranges have  changed for Folate.  Vit D, 25-Hydroxy 03/26/2018 36.3  >=30 ng/mL Final    Comment: <=20 ng/mL. ........... Masoud Mariscal Deficient  21-29 ng/mL. ......... Masoud Mariscal Insufficient  >=30 ng/mL. ........ Masoud Mariscal Sufficient      WBC 03/26/2018 7.3  4.0 - 11.0 K/uL Final    RBC 03/26/2018 4.70  4.20 - 5.90 M/uL Final    Hemoglobin 03/26/2018 15.2  13.5 - 17.5 g/dL Final    Hematocrit 03/26/2018 45.1  40.5 - 52.5 % Final    MCV 03/26/2018 96.1  80.0 - 100.0 fL Final    MCH 03/26/2018 32.4  26.0 - 34.0 pg Final    MCHC 03/26/2018 33.7  31.0 - 36.0 g/dL Final    RDW 03/26/2018 14.2  12.4 - 15.4 % Final    Platelets 97/48/0520 203  135 - 450 K/uL Final    MPV 03/26/2018 10.8* 5.0 - 10.5 fL Final    Neutrophils % 03/26/2018 63.8  % Final    Lymphocytes % 03/26/2018 26.1  % Final    Monocytes %

## 2019-06-13 NOTE — PATIENT INSTRUCTIONS
Patient received dietary handouts and education. Key Low Carb, High Healthy Fat Dietary Points:    - Meats (preferably organic or grass fed) are great sources of protein and are low in carbohydrates. Breonna Brew with coconut, olive, avocado, or almond oils. - When buying dairy, choose regular or full fat options. - Choose vegetables that grow above ground as they are generally lower in carbohydrates. - Avoid bread, rice, potatoes, pasta and all sources of simple sugars (desserts, soda, breakfast cereals). - Choose beverages that are calorie and sugar free, such as water or flavored daily. - When eating fruit, choose berries as a snack option a few times a week.

## 2019-06-13 NOTE — PROGRESS NOTES
Damaso Newell lost 1.2 lbs over past 2 months. Switched from Optifast 3+2 to 1200 kcal LC meal plan. Reports a lot has happened in his personal life since his last visit, states mothers health has really declined and he has not had a lot of time to focus on himself d/t being primary caretaker. Current treatment plan:   Patient is not on medication. Negative side effects from medications? N/A  Patient is not on Optifast.   Patient is  on diet and exercise only plan. Treatment plan details: 1200 kcal LC     Is patient adhering to diet/meal plan?: not really     Carbohydrate consumption: States his eating is more sporadic. Trying to mimic optifast plan with RTD shakes from grocery store. Breakfast: Premier protein clear shake     Snack:    Lunch: Premier protein clear shake     Snack:    Dinner: Baked chicken with tomatoes, mushrooms, cauliflower OR [employer] Mcdouble- meat/cheese/pickle     Snack:    Fluids: Water, decreasing amounts of coffee, premier protein clear     Consuming at least 64oz of calorie free fluids? Yes     Participating in intentional exercise?  No Details: 1 x/week - 70 minute programs (25 minutes cardio)    Plan/Goals:   Try to establish regular eating routine  Focus on meal planning and prepping for busier days - utilize frozen meals, also discussed packing lunch box with ice pack and quick, easy, nutritious options such as nuts, string cheese, lunch meat, etc.   Increase intentional exercise as schedule allows     Handouts: none     Layman Gathers

## 2019-09-12 ENCOUNTER — OFFICE VISIT (OUTPATIENT)
Dept: ORTHOPEDIC SURGERY | Age: 49
End: 2019-09-12
Payer: MEDICAID

## 2019-09-12 VITALS
WEIGHT: 291.89 LBS | DIASTOLIC BLOOD PRESSURE: 87 MMHG | HEIGHT: 72 IN | SYSTOLIC BLOOD PRESSURE: 128 MMHG | HEART RATE: 83 BPM | BODY MASS INDEX: 39.54 KG/M2

## 2019-09-12 DIAGNOSIS — M17.0 PRIMARY OSTEOARTHRITIS OF BOTH KNEES: Primary | ICD-10-CM

## 2019-09-12 PROCEDURE — 20610 DRAIN/INJ JOINT/BURSA W/O US: CPT | Performed by: ORTHOPAEDIC SURGERY

## 2019-09-12 NOTE — PROGRESS NOTES
CORTISONE INJECTION of the bilateral knee(s). HISTORY OF PRESENT ILLNESS: The patient presents for a cortisone injection. Patient has known patellofemoral syndrome and patellofemoral arthritis of the bilateral knees.  He has been treated conservatively for many years. Hans Gibson has been advised to lose weight to off load the pressures on the knees.  Patient has been trying to do so with with the help of the [employer] program with some success but has not made as much improvement as we would have liked to see. PHYSICAL EXAMINATION: Inspection of the bilateral  knee reveals warm, dry, intact skin, and no evidence of infection. There is no effusion. The distal neurovascular exam is grossly intact. PROCEDURE: Risks, benefits, and alternatives to the injections were discussed in detail with the patient. The risks discussed included but are not limited to infection, skin reactions, hot swollen joints, and anaphylaxis. After informed consent was provided, the patient was seated on the exam table with the bilateral knee(s) flexed to 90 degrees. The anterolateral aspect of the knee adjacent to the joint line was prepped with Chlora-prep. The skin and subcutaneous tissues were anesthetized with ethyl chloride spray. A 22-gauge needle was inserted into the bilateral knee(s) and 2 ml of 40 mg/ml DepoMedrol was injected. The needle was withdrawn and the puncture site sealed with a Band-Aid. The patient tolerated the procedure well. Post injection instructions given and any problems to notify us. ASSESSMENT: Osteoarthritis bilateral knee(s). PLAN: Return for follow up as needed. Ursula Reynolds ATC am scribing for and in the presence of Dr. Roney Quiroga. 09/12/19 11:11 AM   Lexx Harris ATC    This dictation was performed with a verbal recognition program (DRAGON) and it was checked for errors. It is possible that there are still dictated errors within this office note.   If so, please

## 2020-02-11 ENCOUNTER — OFFICE VISIT (OUTPATIENT)
Dept: ORTHOPEDIC SURGERY | Age: 50
End: 2020-02-11
Payer: MEDICAID

## 2020-02-11 VITALS
SYSTOLIC BLOOD PRESSURE: 135 MMHG | DIASTOLIC BLOOD PRESSURE: 91 MMHG | BODY MASS INDEX: 39.54 KG/M2 | WEIGHT: 291.89 LBS | HEART RATE: 85 BPM | HEIGHT: 72 IN

## 2020-02-11 PROCEDURE — 99213 OFFICE O/P EST LOW 20 MIN: CPT | Performed by: ORTHOPAEDIC SURGERY

## 2020-02-11 PROCEDURE — G8484 FLU IMMUNIZE NO ADMIN: HCPCS | Performed by: ORTHOPAEDIC SURGERY

## 2020-02-11 PROCEDURE — G8417 CALC BMI ABV UP PARAM F/U: HCPCS | Performed by: ORTHOPAEDIC SURGERY

## 2020-02-11 PROCEDURE — 4004F PT TOBACCO SCREEN RCVD TLK: CPT | Performed by: ORTHOPAEDIC SURGERY

## 2020-02-11 PROCEDURE — 20610 DRAIN/INJ JOINT/BURSA W/O US: CPT | Performed by: ORTHOPAEDIC SURGERY

## 2020-02-11 PROCEDURE — G8427 DOCREV CUR MEDS BY ELIG CLIN: HCPCS | Performed by: ORTHOPAEDIC SURGERY

## 2020-02-11 RX ORDER — LIDOCAINE HYDROCHLORIDE 10 MG/ML
20 INJECTION, SOLUTION INFILTRATION; PERINEURAL ONCE
Status: COMPLETED | OUTPATIENT
Start: 2020-02-11 | End: 2020-02-11

## 2020-02-11 RX ORDER — METHYLPREDNISOLONE ACETATE 40 MG/ML
40 INJECTION, SUSPENSION INTRA-ARTICULAR; INTRALESIONAL; INTRAMUSCULAR; SOFT TISSUE ONCE
Status: COMPLETED | OUTPATIENT
Start: 2020-02-11 | End: 2020-02-11

## 2020-02-11 RX ADMIN — LIDOCAINE HYDROCHLORIDE 20 ML: 10 INJECTION, SOLUTION INFILTRATION; PERINEURAL at 11:10

## 2020-02-11 RX ADMIN — METHYLPREDNISOLONE ACETATE 40 MG: 40 INJECTION, SUSPENSION INTRA-ARTICULAR; INTRALESIONAL; INTRAMUSCULAR; SOFT TISSUE at 11:10

## 2020-02-11 ASSESSMENT — ENCOUNTER SYMPTOMS
ALLERGIC/IMMUNOLOGIC NEGATIVE: 1
GASTROINTESTINAL NEGATIVE: 1
EYES NEGATIVE: 1
RESPIRATORY NEGATIVE: 1

## 2020-02-11 NOTE — PROGRESS NOTES
Review of Systems   Constitutional: Positive for unexpected weight change. HENT: Negative. Eyes: Negative. Respiratory: Negative. Cardiovascular: Negative. Gastrointestinal: Negative. Endocrine: Negative. Genitourinary: Negative. Musculoskeletal: Negative. Skin: Negative. Allergic/Immunologic: Negative. Neurological: Negative. Hematological: Negative.     Psychiatric/Behavioral:        Depression or other problems

## 2020-02-11 NOTE — PROGRESS NOTES
Surgical History:   Procedure Laterality Date    APPENDECTOMY         Family History   Problem Relation Age of Onset    Arthritis Mother     Other Mother         MS    Cancer Father         pancreas    Cancer Maternal Grandmother         colon     Arthritis Maternal Grandmother        Social History     Socioeconomic History    Marital status: Single     Spouse name: None    Number of children: None    Years of education: None    Highest education level: None   Occupational History    None   Social Needs    Financial resource strain: None    Food insecurity:     Worry: None     Inability: None    Transportation needs:     Medical: None     Non-medical: None   Tobacco Use    Smoking status: Current Every Day Smoker     Packs/day: 1.00     Years: 15.00     Pack years: 15.00    Smokeless tobacco: Never Used    Tobacco comment: cutting back   Substance and Sexual Activity    Alcohol use: Yes     Comment:  a few glasses of wine a week    Drug use: Yes     Types: Marijuana     Comment: 2x month    Sexual activity: None   Lifestyle    Physical activity:     Days per week: None     Minutes per session: None    Stress: None   Relationships    Social connections:     Talks on phone: None     Gets together: None     Attends [employer] service: None     Active member of club or organization: None     Attends meetings of clubs or organizations: None     Relationship status: None    Intimate partner violence:     Fear of current or ex partner: None     Emotionally abused: None     Physically abused: None     Forced sexual activity: None   Other Topics Concern    None   Social History Narrative    None       Current Outpatient Medications   Medication Sig Dispense Refill    naproxen-diphenhydramine 220-25 MG TABS Take by mouth      azelastine (ASTELIN) 0.1 % nasal spray 1 spray by Nasal route 2 times daily Use in each nostril as directed      Fluticasone Propionate (FLONASE ALLERGY RELIEF NA) by Nasal joint line    Range of Motion:  0 to 90  and grossly intact with pain and/or difficulty    Strength: Motor is grossly intact    Special Tests: There is no ligament instability. Grinding/Crepitus is posititive    Gait: antalgic  without the use of assistive devices    Alignment: varus    Radiology:     Simone Logan x-rays (3 views: AP, lateral and patella views) of his bilateral KNEE taken 2/11/20 showed mild to moderate patellofemoral osteoarthritis. MD ARTHROCENTESIS ASPIR&/INJ MAJOR JT/BURSA W/O US      Office Procedures:  Orders Placed This Encounter   Procedures    XR KNEE LEFT (3 VIEWS)     Standing Status:   Future     Number of Occurrences:   1     Standing Expiration Date:   2/11/2021    XR KNEE RIGHT (3 VIEWS)     Standing Status:   Future     Number of Occurrences:   1     Standing Expiration Date:   2/11/2021    MD ARTHROCENTESIS ASPIR&/INJ MAJOR JT/BURSA W/O US      After informed consent was provided, the patient lay supine on the exam table. The superolateral aspect of the left knee was prepped with Chlora-prep. The skin and subcutaneous tissues were anesthetized with ethyl chloride spray and 1% lidocaine injected with a 20-gauge needle. Following this injection, two, 50-mL syringe were swapped onto the same inserted needle leading to a total of 90mL of (yellowish fluid) aspirated. The syringe was removed and through the needle,  2 ml of 40mg/ml DepoMedrol was injected. The needle was finally  withdrawn and the puncture site sealed with a Band-Aid. The patient tolerated the procedure well. Assessment: Simone Logan is a 54 y.o. male who presents for follow up of left knee(s). Encounter Diagnoses   Name Primary?  Pain in both knees, unspecified chronicity Yes    Left knee pain, unspecified chronicity        Patient's workup and evaluation were reviewed with the patient in the office today. Imaging was also reviewed with the patient in the clinic today.   Given the patient's history and physical exam the patient has a large effusion contributing to the pain and dysfunction he has been experiencing. The patient should benefit greatly from this aspiration and corticosteroid injection. The patient should continue with conservative management of his patellofemoral arthrosis. We have made weight loss a priority in his treatment plan. Patient was educated on maintaining conservative therapies for her current condition. The non-operative arthritis protocol includes rest, ice, weight control, activity modifications to low impact aerobics (swimming, cycling, elliptical), moderate analgesics (NSAIDS, ice), injections, and physical therapy. Occasaionlly glucosamine chondroitin may be recommended, where 1 out of 3 patients claim it helps with their symptoms. The patient was educated on the influence of decreasing weight and the results this will have on the load delivered through the knees and thereby decreasing knee pain. All the patient's questions were answered while in the clinic. The patient is understanding of all instructions and agrees with the plan for continued conservative treatment of their osteoarthritis. Treatment Plan:    1. Activity modification and rest  2. Ice 20 minutes every 1-2 hours PRN  3. NSAIDs PRN  4. Elevation at least 2 inches above the heart with pillows supporting the joint underneath for swelling  5. Home exercises to maintain strength and range of motion  6. Appropriate diet/weight management  7. Aspiration and corticosteroid injection     Diagnoses and treatments were reviewed with the patient today. Patient education material was provided. Questions were entertained and answered to the patient's satisfaction. Follow up: Holly CARTER ATC am scribing for and in the presence of Dr. Zachary Matos. The physical examination was performed by Dr. Zachary Matos. All counseling during the appointment was performed between the patient and Dr. Zachary Matos. 02/11/20 11:59 AM   Pauline Mcnulty ATC  This dictation was performed with a verbal recognition program (DRAGON) and it was checked for errors. It is possible that there are still dictated errors within this office note. If so, please bring any errors to my attention for an addendum. All efforts were made to ensure that this office note is accurate. Supervising Physician Attestation:  I, Dr. Sydnee Early, personally performed the services described in this documentation as scribed above, and it is both accurate and complete and I agree with all pertinent clinical information. I personally interviewed the patient and performed a physical examination and medical decision making. I discussed the patient's condition and treatment options and have  reviewed and agree with the past medical, family and social history unless otherwise noted. All of the patient's questions were answered.       Board Certified Orthopaedic Surgeon  [facility] and [address]  [facility] and [address] and [employer]  Professor of [address]

## 2020-09-11 ENCOUNTER — TELEPHONE (OUTPATIENT)
Dept: ORTHOPEDIC SURGERY | Age: 50
End: 2020-09-11

## 2020-09-15 ENCOUNTER — OFFICE VISIT (OUTPATIENT)
Dept: ORTHOPEDIC SURGERY | Age: 50
End: 2020-09-15
Payer: MEDICAID

## 2020-09-15 VITALS
SYSTOLIC BLOOD PRESSURE: 137 MMHG | TEMPERATURE: 97.1 F | HEIGHT: 72 IN | WEIGHT: 291.89 LBS | BODY MASS INDEX: 39.54 KG/M2 | DIASTOLIC BLOOD PRESSURE: 84 MMHG | HEART RATE: 99 BPM

## 2020-09-15 PROCEDURE — 99213 OFFICE O/P EST LOW 20 MIN: CPT | Performed by: ORTHOPAEDIC SURGERY

## 2020-09-15 PROCEDURE — G8417 CALC BMI ABV UP PARAM F/U: HCPCS | Performed by: ORTHOPAEDIC SURGERY

## 2020-09-15 PROCEDURE — 20610 DRAIN/INJ JOINT/BURSA W/O US: CPT | Performed by: ORTHOPAEDIC SURGERY

## 2020-09-15 PROCEDURE — 4004F PT TOBACCO SCREEN RCVD TLK: CPT | Performed by: ORTHOPAEDIC SURGERY

## 2020-09-15 PROCEDURE — G8427 DOCREV CUR MEDS BY ELIG CLIN: HCPCS | Performed by: ORTHOPAEDIC SURGERY

## 2020-09-15 RX ORDER — LIDOCAINE HYDROCHLORIDE 10 MG/ML
20 INJECTION, SOLUTION INFILTRATION; PERINEURAL ONCE
Status: COMPLETED | OUTPATIENT
Start: 2020-09-15 | End: 2020-09-15

## 2020-09-15 RX ORDER — METHYLPREDNISOLONE ACETATE 40 MG/ML
40 INJECTION, SUSPENSION INTRA-ARTICULAR; INTRALESIONAL; INTRAMUSCULAR; SOFT TISSUE ONCE
Status: COMPLETED | OUTPATIENT
Start: 2020-09-15 | End: 2020-09-15

## 2020-09-15 RX ADMIN — LIDOCAINE HYDROCHLORIDE 20 ML: 10 INJECTION, SOLUTION INFILTRATION; PERINEURAL at 11:50

## 2020-09-15 RX ADMIN — METHYLPREDNISOLONE ACETATE 40 MG: 40 INJECTION, SUSPENSION INTRA-ARTICULAR; INTRALESIONAL; INTRAMUSCULAR; SOFT TISSUE at 11:50

## 2020-09-15 NOTE — PROGRESS NOTES
Chief Complaint    Follow-up (bilateral knee )      History of Present Illness:  Emilia Caceres is a 52 y.o. male who presents for follow up of bilateral knee(s). The patient has known patellofemoral syndrome and patellofemoral arthritis of the bilateral knees.  He has been treated conservatively for many years. 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 At one point when jumping, Mr. Kadie Albert felt a crunch followed by sharp pain in the outside of his left knee. Back on February 11 2020 the patient underwent an aspiration and corticosteroid injection. He is continuing to experiencing elevated pain, pressure, and swelling that is severely limiting his mobility and overall function. He has been self treating with the RICE protocol at home with little success. He reports that the left knee still feels exteremly swollen and he is able to complete 3-45 minutes of walking without discomfort. He denies any neurological symptoms at this time. Also of significance, the patient has chronic right patellar instability that he experiences on a daily basis MULTIPLE times a day. Patient has been advised to lose weight to off load the pressures on the knees.  Patient has been trying to do so with with the help of the [facility]) weight management program with some success in the past but has not made as much improvement as we would have liked to see. Due to some personal and family health related obligations, the patient has not been able to focus on his own weight management but he has plans to meet back with the [facility]) weight loss program. He has also been unable to workout at the gym due to the pain and pressure he experiences when bending his knee. Today he has questions about using Zilritta vs corticosteroid injection.        Pain Assessment:  Location: left  Level: 6 out of 10  Duration: Weeks  Description: Dull aching  Result of an injury: No  Work related injury: No  Aggravating actions: Light or deep Bending  Relieving Factors: rest  Wants injections: Yes    Pain Assessment:  Location: right  Level: 4 out of 10  Duration: Weeks  Description: dull abd aching  Result of an injury: No  Work related injury: No  Aggravating actions: deep bending  Relieving Factors: rest  Wants injections: Yes     Past Medical History:   Diagnosis Date    Arthritis         Past Surgical History:   Procedure Laterality Date    APPENDECTOMY         Family History   Problem Relation Age of Onset    Arthritis Mother     Other Mother         MS    Cancer Father         pancreas    Cancer Maternal Grandmother         colon     Arthritis Maternal Grandmother        Social History     Socioeconomic History    Marital status: Single     Spouse name: None    Number of children: None    Years of education: None    Highest education level: None   Occupational History    None   Social Needs    Financial resource strain: None    Food insecurity     Worry: None     Inability: None    Transportation needs     Medical: None     Non-medical: None   Tobacco Use    Smoking status: Current Every Day Smoker     Packs/day: 1.00     Years: 15.00     Pack years: 15.00    Smokeless tobacco: Never Used    Tobacco comment: cutting back   Substance and Sexual Activity    Alcohol use: Yes     Comment:  a few glasses of wine a week    Drug use: Yes     Types: Marijuana     Comment: 2x month    Sexual activity: None   Lifestyle    Physical activity     Days per week: None     Minutes per session: None    Stress: None   Relationships    Social connections     Talks on phone: None     Gets together: None     Attends [employer] service: None     Active member of club or organization: None     Attends meetings of clubs or organizations: None     Relationship status: None    Intimate partner violence     Fear of current or ex partner: None     Emotionally abused: None     Physically abused: None     Forced sexual activity: None   Other Topics Concern    None   Social History Narrative    None       Current Outpatient Medications   Medication Sig Dispense Refill    naproxen-diphenhydramine 220-25 MG TABS Take by mouth      azelastine (ASTELIN) 0.1 % nasal spray 1 spray by Nasal route 2 times daily Use in each nostril as directed      Fluticasone Propionate (FLONASE ALLERGY RELIEF NA) by Nasal route       No current facility-administered medications for this visit. Allergies   Allergen Reactions    Other Other (See Comments)     cats       Review of Systems:  A 14 point review of systems was completed by the patient and is available in the media section of the scanned medical record and was reviewed on 9/15/2020. The review is negative with the exception of those things mentioned in the HPI and Past Medical History     Vital Signs:   /84   Pulse 99   Temp 97.1 °F (36.2 °C) (Infrared)   Ht 6' 0.01\" (1.829 m)   Wt 291 lb 14.2 oz (132.4 kg)   BMI 39.58 kg/m²     General Exam:   Mental Status: The patient is oriented to time, place and person. The patient's mood and affect are appropriate. Neurological: The patient has good coordination. There is no weakness or sensory deficit. Knee Examination: Bilateral    Skin:  There are no skin lesions, cellulitis, or extreme edema in the lower extremities. Sensation is grossly intact to light touch bilaterally lower extremity. The patient has warm and well-perfused Bilateral lower extremities with brisk capillary refill. Inspection:  The is severe edema, no gross deformities, and no signs of infection. Palpation: There is patellofemoral crepitus, there is significant tenderness over the knee Lateral joint line    Range of Motion:  0 to 90  and grossly intact with pain and/or difficulty    Strength:   Motor is grossly intact    Special Tests: There is no ligament instability. Grinding/Crepitus positive    Gait: antalgic  without the use of assistive devices    Alignment: varus    Radiology:       None      Office Procedures:  No orders of the defined types were placed in this encounter. Assessment: Everett Mcnamara is a 52 y.o. male who presents for follow up of bilateral knee(s). Encounter Diagnoses   Name Primary?  Patellofemoral arthritis Yes    Pain in both knees, unspecified chronicity     Other old tear of lateral meniscus of left knee        Patient's workup and evaluation were reviewed with the patient in the office today. Imaging was also reviewed with the patient in the clinic today. Given the patient's history and physical exam the patient has a large effusion contributing to the pain and dysfunction he has been experiencing. The patients MRI from March 2019 was reviewed and it should be noted he does have a confirmed lateral mensicus tear in his left knee. The patient should benefit from this aspiration and corticosteroid injection. We have made weight loss a priority in his treatment plan as we can not continue to treat the patient with corticosteroid injection and aspirations in the future.      Patient was educated on maintaining conservative therapies for his current condition. The non-operative arthritis protocol includes rest, ice, weight control, activity modifications to low impact aerobics (swimming, cycling, elliptical), moderate analgesics (NSAIDS, ice), injections, and physical therapy. The patient was educated on the influence of decreasing weight and the results this will have on the load delivered through the knees and thereby decreasing knee pain. All the patient's questions were answered while in the clinic. The patient is understanding of all instructions and agrees with the plan for continued conservative treatment of their osteoarthritis.     Due to the patient benefiting > 6 months on the corticosteroid injection, we will not switch to Scottie Jackson until the patient  displays no benefits from the corticosteroid injection. Treatment Plan:    1. Activity modification and rest  2. Ice 20 minutes every 1-2 hours PRN  3. NSAIDs PRN  4. Elevation at least 2 inches above the heart with pillows supporting the joint underneath for swelling  5. Home exercises to maintain strength and range of motion  6. Appropriate diet/weight management  7. Aspiration and corticosteroid injection       Diagnoses and treatments were reviewed with the patient today. Patient education material was provided. Questions were entertained and answered to the patient's satisfaction. Follow up: PRN      IPrabhu ATC am scribing for and in the presence of Dr. Nikita Sanders. The physical examination was performed by Dr. Nikita Sanders. All counseling during the appointment was performed between the patient and Dr. Nikita Sanders. 09/15/20 11:27 AM   Prabhu Jaime ATC  This dictation was performed with a verbal recognition program [facility]) and it was checked for errors. It is possible that there are still dictated errors within this office note. If so, please bring any errors to my attention for an addendum. All efforts were made to ensure that this office note is accurate. Supervising Physician Attestation:  I, Dr. Nikita Sanders, personally performed the services described in this documentation as scribed above, and it is both accurate and complete and I agree with all pertinent clinical information. I personally interviewed the patient and performed a physical examination and medical decision making. I discussed the patient's condition and treatment options and have  reviewed and agree with the past medical, family and social history unless otherwise noted. All of the patient's questions were answered.       Board Certified Orthopaedic Surgeon  [address] [place] [address] and [address] [place] [address] -

## 2021-03-25 ENCOUNTER — IMMUNIZATION (OUTPATIENT)
Dept: PRIMARY CARE CLINIC | Age: 51
End: 2021-03-25
Payer: MEDICAID

## 2021-03-25 PROCEDURE — 91301 COVID-19, MODERNA VACCINE 100MCG/0.5ML DOSE: CPT | Performed by: FAMILY MEDICINE

## 2021-03-25 PROCEDURE — 0011A PR IMM ADMN SARSCOV2 100 MCG/0.5 ML 1ST DOSE: CPT | Performed by: FAMILY MEDICINE

## 2021-04-22 ENCOUNTER — IMMUNIZATION (OUTPATIENT)
Dept: PRIMARY CARE CLINIC | Age: 51
End: 2021-04-22
Payer: MEDICAID

## 2021-04-22 PROCEDURE — 0012A PR IMM ADMN SARSCOV2 100 MCG/0.5 ML 2ND DOSE: CPT | Performed by: FAMILY MEDICINE

## 2021-04-22 PROCEDURE — 91301 COVID-19, MODERNA VACCINE 100MCG/0.5ML DOSE: CPT | Performed by: FAMILY MEDICINE

## 2021-08-19 ENCOUNTER — OFFICE VISIT (OUTPATIENT)
Dept: FAMILY MEDICINE CLINIC | Age: 51
End: 2021-08-19
Payer: MEDICAID

## 2021-08-19 VITALS
WEIGHT: 315 LBS | HEART RATE: 96 BPM | DIASTOLIC BLOOD PRESSURE: 78 MMHG | TEMPERATURE: 97.8 F | SYSTOLIC BLOOD PRESSURE: 124 MMHG | OXYGEN SATURATION: 97 % | BODY MASS INDEX: 42.66 KG/M2 | HEIGHT: 72 IN

## 2021-08-19 DIAGNOSIS — Z00.00 ANNUAL VISIT FOR GENERAL ADULT MEDICAL EXAMINATION WITHOUT ABNORMAL FINDINGS: Primary | ICD-10-CM

## 2021-08-19 DIAGNOSIS — Z72.52 HIGH RISK HOMOSEXUAL BEHAVIOR: ICD-10-CM

## 2021-08-19 DIAGNOSIS — Z12.11 SCREEN FOR COLON CANCER: ICD-10-CM

## 2021-08-19 DIAGNOSIS — R73.01 IFG (IMPAIRED FASTING GLUCOSE): ICD-10-CM

## 2021-08-19 DIAGNOSIS — Z13.220 SCREENING, LIPID: ICD-10-CM

## 2021-08-19 DIAGNOSIS — D64.9 ANEMIA, UNSPECIFIED TYPE: ICD-10-CM

## 2021-08-19 LAB
A/G RATIO: 1.1 (ref 1.1–2.2)
ALBUMIN SERPL-MCNC: 3.9 G/DL (ref 3.4–5)
ALP BLD-CCNC: 89 U/L (ref 40–129)
ALT SERPL-CCNC: 20 U/L (ref 10–40)
ANION GAP SERPL CALCULATED.3IONS-SCNC: 13 MMOL/L (ref 3–16)
AST SERPL-CCNC: 15 U/L (ref 15–37)
BASOPHILS ABSOLUTE: 0 K/UL (ref 0–0.2)
BASOPHILS RELATIVE PERCENT: 0.7 %
BILIRUB SERPL-MCNC: <0.2 MG/DL (ref 0–1)
BUN BLDV-MCNC: 13 MG/DL (ref 7–20)
CALCIUM SERPL-MCNC: 9.3 MG/DL (ref 8.3–10.6)
CHLORIDE BLD-SCNC: 101 MMOL/L (ref 99–110)
CHOLESTEROL, TOTAL: 171 MG/DL (ref 0–199)
CO2: 23 MMOL/L (ref 21–32)
CREAT SERPL-MCNC: 1.2 MG/DL (ref 0.9–1.3)
EOSINOPHILS ABSOLUTE: 0.1 K/UL (ref 0–0.6)
EOSINOPHILS RELATIVE PERCENT: 1.9 %
FOLATE: 8.5 NG/ML (ref 4.78–24.2)
GFR AFRICAN AMERICAN: >60
GFR NON-AFRICAN AMERICAN: >60
GLOBULIN: 3.4 G/DL
GLUCOSE BLD-MCNC: 114 MG/DL (ref 70–99)
HCT VFR BLD CALC: 42.2 % (ref 40.5–52.5)
HDLC SERPL-MCNC: 37 MG/DL (ref 40–60)
HEMOGLOBIN: 14.3 G/DL (ref 13.5–17.5)
HEPATITIS C ANTIBODY INTERPRETATION: NORMAL
IRON SATURATION: 20 % (ref 20–50)
IRON: 55 UG/DL (ref 59–158)
LDL CHOLESTEROL CALCULATED: 107 MG/DL
LYMPHOCYTES ABSOLUTE: 2 K/UL (ref 1–5.1)
LYMPHOCYTES RELATIVE PERCENT: 31.4 %
MCH RBC QN AUTO: 31.1 PG (ref 26–34)
MCHC RBC AUTO-ENTMCNC: 33.8 G/DL (ref 31–36)
MCV RBC AUTO: 92 FL (ref 80–100)
MONOCYTES ABSOLUTE: 0.7 K/UL (ref 0–1.3)
MONOCYTES RELATIVE PERCENT: 11.2 %
NEUTROPHILS ABSOLUTE: 3.4 K/UL (ref 1.7–7.7)
NEUTROPHILS RELATIVE PERCENT: 54.8 %
PDW BLD-RTO: 14.7 % (ref 12.4–15.4)
PLATELET # BLD: 265 K/UL (ref 135–450)
PMV BLD AUTO: 8.5 FL (ref 5–10.5)
POTASSIUM SERPL-SCNC: 4.9 MMOL/L (ref 3.5–5.1)
RBC # BLD: 4.59 M/UL (ref 4.2–5.9)
SODIUM BLD-SCNC: 137 MMOL/L (ref 136–145)
TOTAL IRON BINDING CAPACITY: 280 UG/DL (ref 260–445)
TOTAL PROTEIN: 7.3 G/DL (ref 6.4–8.2)
TRIGL SERPL-MCNC: 136 MG/DL (ref 0–150)
VITAMIN B-12: 840 PG/ML (ref 211–911)
VITAMIN D 25-HYDROXY: 17.2 NG/ML
VLDLC SERPL CALC-MCNC: 27 MG/DL
WBC # BLD: 6.2 K/UL (ref 4–11)

## 2021-08-19 PROCEDURE — 99386 PREV VISIT NEW AGE 40-64: CPT | Performed by: NURSE PRACTITIONER

## 2021-08-19 PROCEDURE — 36415 COLL VENOUS BLD VENIPUNCTURE: CPT | Performed by: NURSE PRACTITIONER

## 2021-08-19 SDOH — ECONOMIC STABILITY: TRANSPORTATION INSECURITY
IN THE PAST 12 MONTHS, HAS LACK OF TRANSPORTATION KEPT YOU FROM MEETINGS, WORK, OR FROM GETTING THINGS NEEDED FOR DAILY LIVING?: NO

## 2021-08-19 SDOH — ECONOMIC STABILITY: FOOD INSECURITY: WITHIN THE PAST 12 MONTHS, YOU WORRIED THAT YOUR FOOD WOULD RUN OUT BEFORE YOU GOT MONEY TO BUY MORE.: NEVER TRUE

## 2021-08-19 SDOH — ECONOMIC STABILITY: FOOD INSECURITY: WITHIN THE PAST 12 MONTHS, THE FOOD YOU BOUGHT JUST DIDN'T LAST AND YOU DIDN'T HAVE MONEY TO GET MORE.: NEVER TRUE

## 2021-08-19 SDOH — ECONOMIC STABILITY: TRANSPORTATION INSECURITY
IN THE PAST 12 MONTHS, HAS THE LACK OF TRANSPORTATION KEPT YOU FROM MEDICAL APPOINTMENTS OR FROM GETTING MEDICATIONS?: NO

## 2021-08-19 ASSESSMENT — PATIENT HEALTH QUESTIONNAIRE - PHQ9
2. FEELING DOWN, DEPRESSED OR HOPELESS: 2
SUM OF ALL RESPONSES TO PHQ QUESTIONS 1-9: 2
SUM OF ALL RESPONSES TO PHQ QUESTIONS 1-9: 2
SUM OF ALL RESPONSES TO PHQ9 QUESTIONS 1 & 2: 2
SUM OF ALL RESPONSES TO PHQ QUESTIONS 1-9: 2
1. LITTLE INTEREST OR PLEASURE IN DOING THINGS: 0

## 2021-08-19 ASSESSMENT — ENCOUNTER SYMPTOMS
SORE THROAT: 0
SINUS PRESSURE: 0
COUGH: 0
EYE PAIN: 0
ABDOMINAL DISTENTION: 0
SINUS PAIN: 0
EYE REDNESS: 0
SHORTNESS OF BREATH: 0
VOMITING: 0
NAUSEA: 0
DIARRHEA: 0
WHEEZING: 0
EYE DISCHARGE: 0
ABDOMINAL PAIN: 0

## 2021-08-19 ASSESSMENT — SOCIAL DETERMINANTS OF HEALTH (SDOH): HOW HARD IS IT FOR YOU TO PAY FOR THE VERY BASICS LIKE FOOD, HOUSING, MEDICAL CARE, AND HEATING?: NOT HARD AT ALL

## 2021-08-19 NOTE — PATIENT INSTRUCTIONS
Patient Education        Well Visit, Ages 25 to 48: Care Instructions  Overview     Well visits can help you stay healthy. Your doctor has checked your overall health and may have suggested ways to take good care of yourself. Your doctor also may have recommended tests. At home, you can help prevent illness with healthy eating, regular exercise, and other steps. Follow-up care is a key part of your treatment and safety. Be sure to make and go to all appointments, and call your doctor if you are having problems. It's also a good idea to know your test results and keep a list of the medicines you take. How can you care for yourself at home? · Get screening tests that you and your doctor decide on. Screening helps find diseases before any symptoms appear. · Eat healthy foods. Choose fruits, vegetables, whole grains, protein, and low-fat dairy foods. Limit fat, especially saturated fat. Reduce salt in your diet. · Limit alcohol. If you are a man, have no more than 2 drinks a day or 14 drinks a week. If you are a woman, have no more than 1 drink a day or 7 drinks a week. · Get at least 30 minutes of physical activity on most days of the week. Walking is a good choice. You also may want to do other activities, such as running, swimming, cycling, or playing tennis or team sports. Discuss any changes in your exercise program with your doctor. · Reach and stay at a healthy weight. This will lower your risk for many problems, such as obesity, diabetes, heart disease, and high blood pressure. · Do not smoke or allow others to smoke around you. If you need help quitting, talk to your doctor about stop-smoking programs and medicines. These can increase your chances of quitting for good. · Care for your mental health. It is easy to get weighed down by worry and stress. Learn strategies to manage stress, like deep breathing and mindfulness, and stay connected with your family and community.  If you find you often feel sad or hopeless, talk with your doctor. Treatment can help. · Talk to your doctor about whether you have any risk factors for sexually transmitted infections (STIs). You can help prevent STIs if you wait to have sex with a new partner (or partners) until you've each been tested for STIs. It also helps if you use condoms (male or female condoms) and if you limit your sex partners to one person who only has sex with you. Vaccines are available for some STIs, such as HPV. · Use birth control if it's important to you to prevent pregnancy. Talk with your doctor about the choices available and what might be best for you. · If you think you may have a problem with alcohol or drug use, talk to your doctor. This includes prescription medicines (such as amphetamines and opioids) and illegal drugs (such as cocaine and methamphetamine). Your doctor can help you figure out what type of treatment is best for you. · Protect your skin from too much sun. When you're outdoors from 10 a.m. to 4 p.m., stay in the shade or cover up with clothing and a hat with a wide brim. Wear sunglasses that block UV rays. Even when it's cloudy, put broad-spectrum sunscreen (SPF 30 or higher) on any exposed skin. · See a dentist one or two times a year for checkups and to have your teeth cleaned. · Wear a seat belt in the car. When should you call for help? Watch closely for changes in your health, and be sure to contact your doctor if you have any problems or symptoms that concern you. Where can you learn more? Go to https://chelsey.[employer]. org and sign in to your [employer] account. Enter P072 in the Pazien box to learn more about \"Well Visit, Ages 25 to 48: Care Instructions. \"     If you do not have an account, please click on the \"Sign Up Now\" link. Current as of: May 27, 2020               Content Version: 12.9  © 4679-5201 Healthwise, Incorporated. Care instructions adapted under license by [facility] ([facility]). If you have questions about a medical condition or this instruction, always ask your healthcare professional. [place] [address] any warranty or liability for your use of this information.

## 2021-08-19 NOTE — PROGRESS NOTES
Cathleen Bryant (:  1970) is a 54 y.o. male,New patient, here for evaluation of the following chief complaint(s):  Established New Doctor (Mariama Cuba ) and GI Problem (GI PROBLEMS THAT STARTED ON 23 - HE HAS A DAILY LOG OF SYMPTOMS )      ASSESSMENT/PLAN:  1. Annual visit for general adult medical examination without abnormal findings  -Normal exam today  -GI issues likely resulted gastroenteritis. Watchful waiting at this time  -Referred to GI for colonoscopy  -Fasting labs today  -Discussed Truvada given sexual intercourse with men. Declines today.  -Discussed cessation of smoking marijuana as well as medical marijuana. States that this helps with anxiety and may be a candidate for medical.  -Encouraged regular exercise and weight loss. Recommended getting back in touch with the bariatric specialist.  Will place new referral if the patient needs it.  -Follow-up in 1 year for annual physical or sooner if labs indicate. 2. Screening, lipid  -     Lipid Panel; Future  3. Anemia, unspecified type  -History of iron deficiency anemia. Does not require iron supplement at this point. Will obtain labs to reevaluate. -     Vitamin B12 & Folate; Future  -     Vitamin D 25 Hydroxy; Future  -     CBC Auto Differential; Future  -     Iron and TIBC; Future  4. IFG (impaired fasting glucose)  -     Comprehensive Metabolic Panel; Future  -     Hemoglobin A1C; Future  5. Screen for colon cancer  -     Amb External Referral To Gastroenterology  6. High risk homosexual behavior  -HIV and hep C today. Discuss Truvada. Declines today. -     Hepatitis C Antibody; Future  -     HIV Screen; Future      Return in about 1 year (around 2022) for Annual Physical.    SUBJECTIVE/OBJECTIVE:  HPI  Will presents today to establish care. His only concern revolves around some GI upset. He has brought in a log of symptoms he has been experiencing since the end of July.   This included bloating, abdominal pain, nausea. He states that symptoms have resolved this week. He was wondering if this is something he should be concerned about. He had taken various over-the-counter remedies to help with this including simethicone. Denies any abdominal pain, bloating, nausea, or GI upset at this time. Denies any fevers or chills. Coleen Mar 89 medical history includes multiple orthopedic issues. He is seeing Ortho for his knees. The plan is to replace 1 or both of them at some point, but the Ortho would like him to lose weight prior to this. He has seen the bariatric specialist at [place] in the past.  He is debating getting back with them. He also uses a CPAP for sleep apnea. Has followed up with ENT in the past regarding his sinuses. He only takes over-the-counter medications at this time. Family history includes colon cancer, pancreatic cancer, and MS. He cares for his mother. She became wheelchair-bound in the past year. 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 He has a history of being sexually active with both men and women. He is inquiring about numerous lab work to be performed today. Would like hep C and HIV performed. He does not smoke. Occasionally has a glass of wine. Does admit to using marijuana. Review of Systems   Constitutional: Negative for chills and fever. HENT: Negative for ear discharge, ear pain, hearing loss, sinus pressure, sinus pain and sore throat. Eyes: Negative for pain, discharge and redness. Respiratory: Negative for cough, shortness of breath and wheezing. Cardiovascular: Negative for chest pain and palpitations. Gastrointestinal: Negative for abdominal distention, abdominal pain, diarrhea, nausea and vomiting. Genitourinary: Negative for dysuria and hematuria. Musculoskeletal: Negative for myalgias. Skin: Negative for rash. Neurological: Negative for dizziness, weakness, light-headedness, numbness and headaches. Psychiatric/Behavioral: Negative for decreased concentration, dysphoric mood and sleep disturbance. The patient is not nervous/anxious. Physical Exam  Vitals reviewed. Constitutional:       General: He is not in acute distress. Appearance: Normal appearance. He is obese. HENT:      Head: Normocephalic and atraumatic. Right Ear: Tympanic membrane, ear canal and external ear normal.      Left Ear: Tympanic membrane, ear canal and external ear normal.      Nose: Nose normal.      Mouth/Throat:      Mouth: Mucous membranes are moist.      Pharynx: Oropharynx is clear. No posterior oropharyngeal erythema. Eyes:      General: No scleral icterus. Right eye: No discharge. Left eye: No discharge. Extraocular Movements: Extraocular movements intact. Pupils: Pupils are equal, round, and reactive to light. Neck:      Vascular: No carotid bruit. Cardiovascular:      Rate and Rhythm: Normal rate and regular rhythm. Pulses: Normal pulses. Heart sounds: Normal heart sounds. No murmur heard. No gallop. Pulmonary:      Effort: Pulmonary effort is normal.      Breath sounds: Normal breath sounds. No wheezing. Abdominal:      General: Bowel sounds are normal.      Palpations: Abdomen is soft. Tenderness: There is no abdominal tenderness. There is no guarding or rebound. Musculoskeletal:         General: Normal range of motion. Cervical back: Normal range of motion and neck supple. Lymphadenopathy:      Cervical: No cervical adenopathy. Skin:     General: Skin is warm and dry. Capillary Refill: Capillary refill takes less than 2 seconds. Neurological:      Mental Status: He is alert and oriented to person, place, and time. Mental status is at baseline. Psychiatric:         Mood and Affect: Mood normal.         Behavior: Behavior normal.         Thought Content:  Thought content normal.         Judgment: Judgment normal.               This

## 2021-08-20 LAB
ESTIMATED AVERAGE GLUCOSE: 134.1 MG/DL
HBA1C MFR BLD: 6.3 %
HIV AG/AB: NORMAL
HIV ANTIGEN: NORMAL
HIV-1 ANTIBODY: NORMAL
HIV-2 AB: NORMAL

## 2022-01-11 ENCOUNTER — TELEPHONE (OUTPATIENT)
Dept: ORTHOPEDIC SURGERY | Age: 52
End: 2022-01-11

## 2022-01-12 NOTE — TELEPHONE ENCOUNTER
Thanks, Mr. Chanceandres Ventura! If he is already established with our Ortho and has plans to follow up, we will not contact the patient.      Appreciate the feedback,    Ev Trotter

## 2022-01-12 NOTE — TELEPHONE ENCOUNTER
This is the first time I have received this message for a patient. The patient already is following orthopedic surgery Dr. Valencia Starr with plan for replacement after he loses weight.  Not sure if this affects this program.

## 2022-03-02 ENCOUNTER — NURSE TRIAGE (OUTPATIENT)
Dept: OTHER | Facility: CLINIC | Age: 52
End: 2022-03-02

## 2022-03-02 NOTE — TELEPHONE ENCOUNTER
Received call from Shahriar Onofre at [facility], caller not on line. Complaint: swelling in LLE, dark colored calf, feels tight    Practice Name: [address] [place] [address]    Caller's telephone number verified as [phone]    Unsuccessful attempt to re-connect with caller via phone, left message for return call to office            Reason for Disposition   Message left on unidentified voice mail. Phone number verified.     Protocols used: NO CONTACT OR DUPLICATE CONTACT CALL-ADULT-[place]

## 2022-12-02 ENCOUNTER — TELEPHONE (OUTPATIENT)
Dept: FAMILY MEDICINE CLINIC | Age: 52
End: 2022-12-02

## 2022-12-02 NOTE — TELEPHONE ENCOUNTER
CALLED PT HE SAID HE HAS A LOT OF EDEMA AND DISCOLORATION IN BOTH LEGS, SWELLING IS GETTING WORSE, I SUGGESTED ER PT JUST WANTS TO BE SEEN Monday SCHED WITH JOHNNY Rodriguez

## 2022-12-05 ENCOUNTER — OFFICE VISIT (OUTPATIENT)
Dept: FAMILY MEDICINE CLINIC | Age: 52
End: 2022-12-05
Payer: COMMERCIAL

## 2022-12-05 VITALS
DIASTOLIC BLOOD PRESSURE: 88 MMHG | HEIGHT: 72 IN | HEART RATE: 96 BPM | SYSTOLIC BLOOD PRESSURE: 136 MMHG | BODY MASS INDEX: 42.66 KG/M2 | WEIGHT: 315 LBS | OXYGEN SATURATION: 98 %

## 2022-12-05 DIAGNOSIS — R73.03 PREDIABETES: ICD-10-CM

## 2022-12-05 DIAGNOSIS — S83.203S: ICD-10-CM

## 2022-12-05 DIAGNOSIS — E78.2 MIXED HYPERLIPIDEMIA: ICD-10-CM

## 2022-12-05 DIAGNOSIS — E55.9 VITAMIN D DEFICIENCY: ICD-10-CM

## 2022-12-05 DIAGNOSIS — Z13.31 POSITIVE DEPRESSION SCREENING: ICD-10-CM

## 2022-12-05 DIAGNOSIS — E61.1 IRON DEFICIENCY: ICD-10-CM

## 2022-12-05 DIAGNOSIS — R60.0 BILATERAL LOWER EXTREMITY EDEMA: Primary | ICD-10-CM

## 2022-12-05 PROCEDURE — 3017F COLORECTAL CA SCREEN DOC REV: CPT

## 2022-12-05 PROCEDURE — G8427 DOCREV CUR MEDS BY ELIG CLIN: HCPCS

## 2022-12-05 PROCEDURE — 99214 OFFICE O/P EST MOD 30 MIN: CPT

## 2022-12-05 PROCEDURE — G8484 FLU IMMUNIZE NO ADMIN: HCPCS

## 2022-12-05 PROCEDURE — G8417 CALC BMI ABV UP PARAM F/U: HCPCS

## 2022-12-05 PROCEDURE — 4004F PT TOBACCO SCREEN RCVD TLK: CPT

## 2022-12-05 RX ORDER — FUROSEMIDE 20 MG/1
20 TABLET ORAL DAILY
Qty: 30 TABLET | Refills: 0 | Status: SHIPPED | OUTPATIENT
Start: 2022-12-05

## 2022-12-05 SDOH — ECONOMIC STABILITY: FOOD INSECURITY: WITHIN THE PAST 12 MONTHS, THE FOOD YOU BOUGHT JUST DIDN'T LAST AND YOU DIDN'T HAVE MONEY TO GET MORE.: NEVER TRUE

## 2022-12-05 SDOH — ECONOMIC STABILITY: FOOD INSECURITY: WITHIN THE PAST 12 MONTHS, YOU WORRIED THAT YOUR FOOD WOULD RUN OUT BEFORE YOU GOT MONEY TO BUY MORE.: NEVER TRUE

## 2022-12-05 ASSESSMENT — ENCOUNTER SYMPTOMS
BACK PAIN: 0
SHORTNESS OF BREATH: 0
COUGH: 0
PHOTOPHOBIA: 0
WHEEZING: 0
COLOR CHANGE: 0
CHEST TIGHTNESS: 0

## 2022-12-05 ASSESSMENT — PATIENT HEALTH QUESTIONNAIRE - PHQ9
8. MOVING OR SPEAKING SO SLOWLY THAT OTHER PEOPLE COULD HAVE NOTICED. OR THE OPPOSITE, BEING SO FIGETY OR RESTLESS THAT YOU HAVE BEEN MOVING AROUND A LOT MORE THAN USUAL: 1
SUM OF ALL RESPONSES TO PHQ QUESTIONS 1-9: 9
7. TROUBLE CONCENTRATING ON THINGS, SUCH AS READING THE NEWSPAPER OR WATCHING TELEVISION: 1
3. TROUBLE FALLING OR STAYING ASLEEP: 1
SUM OF ALL RESPONSES TO PHQ QUESTIONS 1-9: 10
2. FEELING DOWN, DEPRESSED OR HOPELESS: 2
SUM OF ALL RESPONSES TO PHQ QUESTIONS 1-9: 10
SUM OF ALL RESPONSES TO PHQ QUESTIONS 1-9: 10
9. THOUGHTS THAT YOU WOULD BE BETTER OFF DEAD, OR OF HURTING YOURSELF: 1
SUM OF ALL RESPONSES TO PHQ9 QUESTIONS 1 & 2: 3
1. LITTLE INTEREST OR PLEASURE IN DOING THINGS: 1
5. POOR APPETITE OR OVEREATING: 1
10. IF YOU CHECKED OFF ANY PROBLEMS, HOW DIFFICULT HAVE THESE PROBLEMS MADE IT FOR YOU TO DO YOUR WORK, TAKE CARE OF THINGS AT HOME, OR GET ALONG WITH OTHER PEOPLE: 2
6. FEELING BAD ABOUT YOURSELF - OR THAT YOU ARE A FAILURE OR HAVE LET YOURSELF OR YOUR FAMILY DOWN: 1
4. FEELING TIRED OR HAVING LITTLE ENERGY: 1

## 2022-12-05 ASSESSMENT — COLUMBIA-SUICIDE SEVERITY RATING SCALE - C-SSRS
1. WITHIN THE PAST MONTH, HAVE YOU WISHED YOU WERE DEAD OR WISHED YOU COULD GO TO SLEEP AND NOT WAKE UP?: NO
6. HAVE YOU EVER DONE ANYTHING, STARTED TO DO ANYTHING, OR PREPARED TO DO ANYTHING TO END YOUR LIFE?: YES
2. HAVE YOU ACTUALLY HAD ANY THOUGHTS OF KILLING YOURSELF?: NO
7. DID THIS OCCUR IN THE LAST THREE MONTHS: NO

## 2022-12-05 ASSESSMENT — SOCIAL DETERMINANTS OF HEALTH (SDOH): HOW HARD IS IT FOR YOU TO PAY FOR THE VERY BASICS LIKE FOOD, HOUSING, MEDICAL CARE, AND HEATING?: NOT HARD AT ALL

## 2022-12-05 NOTE — LETTER
[address] [facility]  [address] [facility] [address]  Phone: [phone]  Fax: [phone] [facility], APRN - CNP        December 5, 2022     Patient: Jalen Siu   YOB: 1970   Date of Visit: 12/5/2022       To Whom It May Concern: It is my medical opinion that Charleen Gutierrez should remain out of work until 12/12/2022. If you have any questions or concerns, please don't hesitate to call.     Sincerely,        Isela Perry, APRN - CNP

## 2022-12-05 NOTE — PROGRESS NOTES
Courtney Ng (:  1970) is a 50 y.o. male,Established patient, here for evaluation of the following chief complaint(s):  Swelling (OSCAR LEG SWELLING, LEFT LOWER DARKENING OR DISCOLORATION, LL PITTING EDEMA, LL DRY SKIN, RIGHT LOWER SAME, RIGHT LOWER TINGLING AND DISCOMFORT,  DISLOCATION AND INJURY ) and Depression (+ DEPRESSION SCREEN )         ASSESSMENT/PLAN:  1. Bilateral lower extremity edema  -BLE edema with 2+ pitting. Absence of pain. No discoloration of the skin. Possibly venous insufficiency or secondary to bilateral knee injuries. -Treatment options discussed. Furosemide is being sent to the pharmacy. The medication uses and side effects were discussed with the patient. Patient verbalized understanding and agrees to the plan.   -Educated patient is only 1 month furosemide, not a chronic medication.  -Educated to elevate legs when at rest.  Educated to get compression socks/stockings over-the-counter and wear while working. Patient verbalized understanding.  -Follow-up as needed if symptoms worsen or fail to improve. -     furosemide (LASIX) 20 MG tablet; Take 1 tablet by mouth daily, Disp-30 tablet, R-0Normal  2. Other tear of meniscus of right knee as current injury, unspecified meniscus, sequela  -History of previous meniscal injury of the right knee with osteoarthritis of the left knee. -New referral placed to Dr. Brianna Calderón. Educated patient it is his responsibility to call this number and get appointment scheduled. He verbalized understanding.  -Ace wrap was applied to right knee in office today. Educated to wear this when active and up. Can take off at rest.  Patient verbalized understanding.  -     Latricia Reddy MD, Orthopedic Surgery (Trauma; Knee; Shoulder), [facility]  3. Positive depression screening  -Positive depression screening. Patient declines any SI/HI.   Educated on [phone].  -Patient states overwhelming stress of life is the cause.  -He states he would never again take any antidepressant medication because of how he feels.  -Recommending therapy. He said he would consider. Mental health packet provided. Educated patient his is approximately to pursue this. He verbalized understanding.  -Additionally discussed exercises mental health management. He agrees with this plan. -He continues to use his friends as a support, for mental health and for life. -Follow-up as needed. 4. Prediabetes  -Like blood work rechecked as it has not been done in over a year. Not fasting today. Will get outpatient. Educated fast for least 9 hours, water or black coffee only. Educated is his responsibility get these taken care of at this point, outpatient only. Patient verbalized understanding.  -     Comprehensive Metabolic Panel; Future  -     Hemoglobin A1C; Future  5. Mixed hyperlipidemia  -     Lipid Panel; Future  6. Iron deficiency  -     Iron and TIBC; Future  7. Vitamin D deficiency  -     Vitamin D 25 Hydroxy; Future    Return if symptoms worsen or fail to improve. Subjective   SUBJECTIVE/OBJECTIVE:  HPI  Will presents today for swelling of bilateral legs as well as right knee pain. He has been having issues with bilateral legs swelling since the beginning of this year. He documented his history of his swelling in his legs this year with the note, provided to me in the office. It is attached below. He has some life stress that prevented him from being able to come to the office sooner. Last Thursday, he twisted his right knee and had significant pain. He states he felt like it dislocated the meniscus, and it is not 100% back in place. He notes that he has a history of a meniscus tear from a fall 8 years ago, and has been low clicking more often since this injury. He was seen Ortho previously and was instructed to lose weight before surgery. Although he did momentarily lose the weight, he never had surgery taken care of.   He did have aspirations of synovial fluid, and cortisone shots previously. Following the injury on for last Thursday, he was experiencing decreased ability of pain with ambulation, no pain at rest.  While bearing weight has improved, the pain continues with ambulation. He has not been wearing any brace or Ace wrap. His legs continue with bilateral lower extremity pitting from earlier this year. He denies any calf pain, excessive warmth, posterior calf pain, or redness. He takes Aleve for sharp pains in his knee, and takes ibuprofen for dull pain in his back. Additionally, he has been more stressed recently because he has been busy caring for his mom and also with his new job. Is otherwise MS, and recently has lost her ability to walk, sounds but he lives with his mom. He was on antidepressants before and did not like the way he felt on them. He does not want to ever take this again. He does report that he has a solid support network of friends. Denies SI/HI, or debilitating anxiety or depression. Review of Systems   Constitutional:  Negative for activity change, appetite change, diaphoresis, fatigue and unexpected weight change. Eyes:  Negative for photophobia and visual disturbance. Respiratory:  Negative for cough, chest tightness, shortness of breath and wheezing. Cardiovascular:  Positive for leg swelling. Negative for chest pain and palpitations. Endocrine: Negative for cold intolerance and heat intolerance. Musculoskeletal:  Positive for arthralgias (bilateral knees) and gait problem (limp). Negative for back pain and myalgias. Skin:  Negative for color change, rash and wound. Neurological:  Negative for dizziness, weakness, light-headedness, numbness and headaches. Psychiatric/Behavioral:  Positive for dysphoric mood. Negative for agitation, behavioral problems, confusion, decreased concentration, self-injury, sleep disturbance and suicidal ideas. The patient is nervous/anxious.          Objective Physical Exam  Vitals and nursing note reviewed. Constitutional:       General: He is not in acute distress. Appearance: Normal appearance. He is obese. He is not diaphoretic. HENT:      Head: Normocephalic and atraumatic. Cardiovascular:      Rate and Rhythm: Normal rate and regular rhythm. Pulses: Normal pulses. Heart sounds: Normal heart sounds. No murmur heard. No gallop. Pulmonary:      Effort: Pulmonary effort is normal. No respiratory distress. Breath sounds: Normal breath sounds. No wheezing. Chest:      Chest wall: No tenderness. Musculoskeletal:         General: No swelling, deformity or signs of injury. Normal range of motion. Cervical back: Normal range of motion. No tenderness. Right knee: Crepitus present. Tenderness present. Left knee: No crepitus. Tenderness present. Right lower leg: No tenderness. 2+ Edema present. Left lower leg: No tenderness. 2+ Edema present. Lymphadenopathy:      Cervical: No cervical adenopathy. Skin:     General: Skin is warm and dry. Capillary Refill: Capillary refill takes less than 2 seconds. Coloration: Skin is not jaundiced or pale. Findings: No bruising. Neurological:      Mental Status: He is alert and oriented to person, place, and time. Psychiatric:         Attention and Perception: Attention and perception normal.         Mood and Affect: Affect normal. Mood is anxious. Mood is not depressed. Speech: Speech normal.         Behavior: Behavior normal. Behavior is cooperative. Thought Content: Thought content normal. Thought content does not include homicidal or suicidal ideation. Thought content does not include homicidal or suicidal plan.          Cognition and Memory: Cognition and memory normal.         Judgment: Judgment normal.            On this date 12/5/2022 I have spent 35 minutes reviewing previous notes, test results and face to face with the patient discussing the diagnosis and importance of compliance with the treatment plan as well as documenting on the day of the visit. An electronic signature was used to authenticate this note. --CHELSEA Piedra - CNP       Please note that this chart was generated using dragon dictation software. Although every effort was made to ensure the accuracy of this automated transcription, some errors in transcription may have occurred.

## 2022-12-06 DIAGNOSIS — E55.9 VITAMIN D DEFICIENCY: ICD-10-CM

## 2022-12-06 DIAGNOSIS — E61.1 IRON DEFICIENCY: ICD-10-CM

## 2022-12-06 DIAGNOSIS — E78.2 MIXED HYPERLIPIDEMIA: ICD-10-CM

## 2022-12-06 DIAGNOSIS — R73.03 PREDIABETES: ICD-10-CM

## 2022-12-06 LAB
A/G RATIO: 1.4 (ref 1.1–2.2)
ALBUMIN SERPL-MCNC: 4.1 G/DL (ref 3.4–5)
ALP BLD-CCNC: 90 U/L (ref 40–129)
ALT SERPL-CCNC: 21 U/L (ref 10–40)
ANION GAP SERPL CALCULATED.3IONS-SCNC: 18 MMOL/L (ref 3–16)
AST SERPL-CCNC: 15 U/L (ref 15–37)
BILIRUB SERPL-MCNC: 0.3 MG/DL (ref 0–1)
BUN BLDV-MCNC: 13 MG/DL (ref 7–20)
CALCIUM SERPL-MCNC: 9.5 MG/DL (ref 8.3–10.6)
CHLORIDE BLD-SCNC: 106 MMOL/L (ref 99–110)
CHOLESTEROL, TOTAL: 194 MG/DL (ref 0–199)
CO2: 22 MMOL/L (ref 21–32)
CREAT SERPL-MCNC: 1.2 MG/DL (ref 0.9–1.3)
GFR SERPL CREATININE-BSD FRML MDRD: >60 ML/MIN/{1.73_M2}
GLUCOSE BLD-MCNC: 95 MG/DL (ref 70–99)
HDLC SERPL-MCNC: 44 MG/DL (ref 40–60)
IRON SATURATION: 20 % (ref 20–50)
IRON: 64 UG/DL (ref 59–158)
LDL CHOLESTEROL CALCULATED: 129 MG/DL
POTASSIUM SERPL-SCNC: 4.8 MMOL/L (ref 3.5–5.1)
SODIUM BLD-SCNC: 146 MMOL/L (ref 136–145)
TOTAL IRON BINDING CAPACITY: 325 UG/DL (ref 260–445)
TOTAL PROTEIN: 7 G/DL (ref 6.4–8.2)
TRIGL SERPL-MCNC: 107 MG/DL (ref 0–150)
VITAMIN D 25-HYDROXY: 21.3 NG/ML
VLDLC SERPL CALC-MCNC: 21 MG/DL

## 2022-12-07 LAB
ESTIMATED AVERAGE GLUCOSE: 122.6 MG/DL
HBA1C MFR BLD: 5.9 %

## 2022-12-14 ENCOUNTER — OFFICE VISIT (OUTPATIENT)
Dept: ORTHOPEDIC SURGERY | Age: 52
End: 2022-12-14

## 2022-12-14 VITALS — HEIGHT: 72 IN | BODY MASS INDEX: 42.66 KG/M2 | WEIGHT: 315 LBS

## 2022-12-14 DIAGNOSIS — S83.241A ACUTE MEDIAL MENISCUS TEAR OF RIGHT KNEE, INITIAL ENCOUNTER: Primary | ICD-10-CM

## 2022-12-14 DIAGNOSIS — F17.200 CURRENT SMOKER: ICD-10-CM

## 2022-12-14 DIAGNOSIS — M25.561 RIGHT KNEE PAIN, UNSPECIFIED CHRONICITY: ICD-10-CM

## 2022-12-14 SDOH — HEALTH STABILITY: PHYSICAL HEALTH: ON AVERAGE, HOW MANY DAYS PER WEEK DO YOU ENGAGE IN MODERATE TO STRENUOUS EXERCISE (LIKE A BRISK WALK)?: 2 DAYS

## 2022-12-14 SDOH — HEALTH STABILITY: PHYSICAL HEALTH: ON AVERAGE, HOW MANY MINUTES DO YOU ENGAGE IN EXERCISE AT THIS LEVEL?: 40 MIN

## 2022-12-14 ASSESSMENT — SOCIAL DETERMINANTS OF HEALTH (SDOH)
WITHIN THE LAST YEAR, HAVE YOU BEEN KICKED, HIT, SLAPPED, OR OTHERWISE PHYSICALLY HURT BY YOUR PARTNER OR EX-PARTNER?: NO
WITHIN THE LAST YEAR, HAVE YOU BEEN AFRAID OF YOUR PARTNER OR EX-PARTNER?: NO
WITHIN THE LAST YEAR, HAVE YOU BEEN HUMILIATED OR EMOTIONALLY ABUSED IN OTHER WAYS BY YOUR PARTNER OR EX-PARTNER?: NO
WITHIN THE LAST YEAR, HAVE TO BEEN RAPED OR FORCED TO HAVE ANY KIND OF SEXUAL ACTIVITY BY YOUR PARTNER OR EX-PARTNER?: NO

## 2022-12-15 RX ORDER — NAPROXEN 500 MG/1
500 TABLET ORAL 2 TIMES DAILY WITH MEALS
Qty: 60 TABLET | Refills: 0 | Status: SHIPPED | OUTPATIENT
Start: 2022-12-15 | End: 2023-01-14

## 2022-12-18 PROBLEM — S83.241A ACUTE MEDIAL MENISCUS TEAR OF RIGHT KNEE: Status: ACTIVE | Noted: 2022-12-18

## 2022-12-18 PROBLEM — F17.200 CURRENT SMOKER: Status: ACTIVE | Noted: 2022-12-18

## 2022-12-18 NOTE — PROGRESS NOTES
CHIEF COMPLAINT: Right knee pain/ possible medial meniscus tear. HISTORY:  Mr. Nora Nance 50 y.o.  male presents today for consultation request from CHELSEA Bangura CNP for evaluation of right knee pain which started 8 ears ago when he fell backward and bent his leg. He is complaining of sharp pain, 5/10. Pain is increase with standing and walking and decrease with rest. Pain is sharp early in the morning with first few steps, dull achy pain by the end of the day. Alleviating factors: rest. No radiation and no numbness and tingling sensation. No other complaint. There is a h/o catching and locking. No h/o gout. Past Medical History:   Diagnosis Date    Arthritis        Past Surgical History:   Procedure Laterality Date    APPENDECTOMY         Social History     Socioeconomic History    Marital status: Single     Spouse name: Not on file    Number of children: Not on file    Years of education: Not on file    Highest education level: Not on file   Occupational History    Not on file   Tobacco Use    Smoking status: Every Day     Packs/day: 0.50     Years: 15.00     Pack years: 7.50     Types: Cigarettes     Start date: 1/1/2000    Smokeless tobacco: Never    Tobacco comments:     Working to reduce all usage of tobacco and alcohol   Substance and Sexual Activity    Alcohol use:  Yes     Alcohol/week: 9.0 standard drinks     Types: 5 Glasses of wine, 4 Shots of liquor per week     Comment:  a few glasses of wine a week    Drug use: Yes     Types: Marijuana Fredi Passy)     Comment: 2x month    Sexual activity: Not Currently     Partners: Male   Other Topics Concern    Not on file   Social History Narrative    Not on file     Social Determinants of Health     Financial Resource Strain: Low Risk     Difficulty of Paying Living Expenses: Not hard at all   Food Insecurity: No Food Insecurity    Worried About Running Out of Food in the Last Year: Never true    Ran Out of Food in the Last Year: Never true   Transportation Needs: Not on file   Physical Activity: Insufficiently Active    Days of Exercise per Week: 2 days    Minutes of Exercise per Session: 40 min   Stress: Not on file   Social Connections: Not on file   Intimate Partner Violence: Not At Risk    Fear of Current or Ex-Partner: No    Emotionally Abused: No    Physically Abused: No    Sexually Abused: No   Housing Stability: Not on file       Family History   Problem Relation Age of Onset    Arthritis Mother     Other Mother         Multiple Sclerosis    High Blood Pressure Mother     Cancer Father         pancreas    Cancer Maternal Grandmother         colon     Arthritis Maternal Grandmother     Obesity Brother        Current Outpatient Medications on File Prior to Visit   Medication Sig Dispense Refill    furosemide (LASIX) 20 MG tablet Take 1 tablet by mouth daily 30 tablet 0    naproxen-diphenhydramine 220-25 MG TABS Take by mouth      azelastine (ASTELIN) 0.1 % nasal spray 1 spray by Nasal route 2 times daily Use in each nostril as directed      Fluticasone Propionate (FLONASE ALLERGY RELIEF NA) by Nasal route       No current facility-administered medications on file prior to visit. Pertinent items are noted in HPI  Review of systems reviewed from Patient History Form and available in the patient's chart under the Media tab. No change noted. PHYSICAL EXAMINATION:  Mr. Elzie Heimlich is a very pleasant 50 y.o.  male who presents today in no acute distress, awake, alert, and oriented. He is well dressed, nourished and  groomed. Patient with normal affect. Height is  6' (1.829 m), weight is (!) 347 lb (157.4 kg), Body mass index is 47.06 kg/m². Resting respiratory rate is 16. Examination of the gait, showed that the patient walks heel-toe with a non-antalgic gait and no limp.   Examination of both knees showing full ROM, right knee with mild crepitus, tenderness on medial joint line, positive Taj test, stable to varus and valgus stress. He has intact sensation and good pedal pulses. He has good strength in 2 planes. Knee reflex 1+ bilaterally. IMAGING:  Xray 3 views of the right knee was obtained today in the office and reviewed. These demonstrate mild degenerative changes with narrowing of the joint space in the medial joint space compartment, no fracture. IMPRESSION: Right knee pain/ possible medial meniscus tear. PLAN: I discussed with the patient the treatment options including both surgical and non-surgical treatment. We recommended Quad exercises and stretching of the calf and hamstrings which was taught to the patient today. He will take NSAIDS Naprosyn. I discussed with the patient that I think that he would really benefit from a course of physical therapy for further strengthening and stretching. An Rx for physical therapy was given to the patient. F/u in 4 weeks, MRI if needed. He understands that this may need surgery if the pain did not to resolve. The patient smokes cigarettes, and we discussed with the patient the risks of smoking on general health and also on bone and soft tissue healing (delay and non-union), and promised to cut down or stop smoking. Smoking: Educated the patient regarding the hazards of smoking and that it harms their body in many ways. It increases the chance of developing heart disease, lung disease, cancer, and other health problems including poor bone and wound healing. The importance of smoking cessation for optimal bone and wound healing was stressed. This was communicated verbally, 5 Minutes. Thank you very much for the kind consultation and allowing me to participate in this patient's care. I will continue to keep you apprised of his progress.         Latesha Chavez MD

## 2022-12-20 ENCOUNTER — HOSPITAL ENCOUNTER (OUTPATIENT)
Dept: PHYSICAL THERAPY | Age: 52
Setting detail: THERAPIES SERIES
Discharge: HOME | End: 2022-12-20
Payer: COMMERCIAL

## 2022-12-20 PROCEDURE — 97161 PT EVAL LOW COMPLEX 20 MIN: CPT | Performed by: CHIROPRACTOR

## 2022-12-20 PROCEDURE — 97530 THERAPEUTIC ACTIVITIES: CPT | Performed by: CHIROPRACTOR

## 2022-12-20 NOTE — FLOWSHEET NOTE
[facility] - Outpatient Rehabilitation and Therapy, [facility]  [address] [facility], [facility]  Phone: [phone]   Fax:     [phone]      Physical Therapy Treatment Note/ Progress Report:     Date:  2022    Patient Name:  Courtney Ng    :  1970  MRN: [id number]    Pertinent Medical History:Additional Pertinent Hx: Arthritis, Morbid Obesity, Depression    Medical/Treatment Diagnosis Information:  Medical Diagnosis: Acute medial meniscus tear of right knee, initial encounter [S83.241A]  Treatment Diagnosis: Decreased Knee strength / ROM    Insurance/Certification information:  PT Insurance Information: UMR  Physician Information:  Bronwyn Quiroz MD  Plan of care signed (Y/N): Inbox    Date of Patient follow up with Physician:      Progress Report: []  Yes  [x]  No     Date Range for reporting period:  Beginnin2022  Ending:      Progress report due (10 Rx/or 30 days whichever is less):      Recertification due (POC duration/ or 90 days whichever is less):      Visit # POC/Insurance Allowable Auth Needed     () []Yes   [x]No     Latex Allergy:  [x]NO      []YES  Preferred Language for Healthcare:   [x]English       []Other:    Functional Scale:       Date assessed: at [facility]  Test:FOTO  Score:    Pain level:  Ranges 2-7/10     History of Injury:    Knee pain since a fall ~ 8 yrs ago    SUBJECTIVE:  C/o R knee pain and feeling like his knee wants to [employer]" on him    OBJECTIVE:  Observation:   Test measurements:      MRI 2019   CONCLUSION:   1. Complex tear with radial and horizontal components involving the posterior horn and midbody    of the lateral meniscus with the horizontal tear extending into the anterior horn. 2. 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    3. Patella connie with patellar chondromalacia. 4. Small knee joint effusion with synovial thickening involving the suprapatellar recess. 5. Superficial venous varicosities are observed, particularly lateral to the knee joint. RESTRICTIONS/PRECAUTIONS:     Exercises/Interventions:     Therapeutic Ex (92166)   Min: Reps/Resistance Notes/CUES   Nu-step          GSS/HSS     HR/TR          FAQ     HS Curl       Leg press          SLR 2# - 2x10   R    SAQ 4# - 2x10   R         Therapeutic Activity (51902) Min:      Reviewed home exer     Work on improved \"Push-off: during gait          Gum Drops     Rocker Board     SLB     BOSU               NMR re-education (09553)  Min:  CUES NEEDED             Manual Intervention ([address] [facility]) Min:                               Modalities  Min:                      Other Therapeutic Activities: Pt was educated on PT POC, Diagnosis, Prognosis, pathomechanics as well as frequency and duration of scheduling future physical therapy appointments. Time was also taken on this day to answer all patient questions and participation in PT. Reviewed appointment policy in detail with patient and patient verbalized understanding. Home Exercise Program: Patient was instructed in the following for HEP:     . Patient verbalized/demonstrated understanding and was issued written handout. Therapeutic Exercise and NMR EXR  [] (46791) Provided verbal/tactile cueing for activities related to strengthening, flexibility, endurance, ROM for improvements in LE, proximal hip, and core control with self care, mobility, lifting, ambulation.  [] (19680) Provided verbal/tactile cueing for activities related to improving balance, coordination, kinesthetic sense, posture, motor skill, proprioception  to assist with LE, proximal hip, and core control in self care, mobility, lifting, ambulation and eccentric single leg control.  [address] and Therapeutic Activities:    [] (86945 or ) Provided verbal/tactile cueing for activities related to improving balance, coordination, kinesthetic sense, posture, motor skill, proprioception and motor activation to allow for proper function of core, proximal hip and LE with self care and ADLs and functional mobility.   [] (38204) Gait Re-education- Provided training and instruction to the patient for proper LE, core and proximal hip recruitment and positioning and eccentric body weight control with ambulation re-education including up and down stairs     Home Exercise Program:    [x] (32830) Reviewed/Progressed HEP activities related to strengthening, flexibility, endurance, ROM of core, proximal hip and LE for functional self-care, mobility, lifting and ambulation/stair navigation   [] (47759)Reviewed/Progressed HEP activities related to improving balance, coordination, kinesthetic sense, posture, motor skill, proprioception of core, proximal hip and LE for self care, mobility, lifting, and ambulation/stair navigation      Manual Treatments:  PROM / STM / Oscillations-Mobs:  G-I, II, III, IV (PA's, Inf., Post.)  [] (40926) Provided manual therapy to mobilize LE, proximal hip and/or LS spine soft tissue/joints for the purpose of modulating pain, promoting relaxation,  increasing ROM, reducing/eliminating soft tissue swelling/inflammation/restriction, improving soft tissue extensibility and allowing for proper ROM for normal function with self care, mobility, lifting and ambulation.      If [facility] Please Indicate Time In/Out  CPT Code Time in Time out                         Approval Dates:  CPT Code Units Approved Units Used  Date Updated:                     Charges:  Timed Code Treatment Minutes: 30   Total Treatment Minutes: 45      [] EVAL (LOW) 79681 (typically 20 minutes face-to-face)  [] EVAL (MOD) 58761 (typically 30 minutes face-to-face)  [] EVAL (HIGH) 74213 (typically 45 minutes face-to-face)  [] RE-EVAL     [] VO(50815) x     [] Dry needle 1 or 2 Muscles (05823)  [] NMR (36156) x     [] Dry needle 3+ Muscles (11982)  [] Manual (38229) x     [] Ultrasound (31333) x  [] TA (45292) x     [] Mech Traction (90180)  [] ES(attended) (72675)     [] ES (un) (81328):   [] Vasopump (22153) [] Ionto (48273)   [] Other:    GOALS:  Patient stated goal: Feeling good support in my knee   [] Progressing: [] Met: [] Not Met: [] Adjusted    Therapist goals for Patient:   Short Term Goals: To be achieved in: 2 weeks  1. Independent in HEP and progression per patient tolerance, in order to prevent re-injury. [] Progressing: [] Met: [] Not Met: [] Adjusted  2. Patient will have a decrease in pain to facilitate improvement in movement, function, and ADLs as indicated by Functional Deficits. [] Progressing: [] Met: [] Not Met: [] Adjusted    Long Term Goals: To be achieved in: 6 weeks  1. Increase FOTO functional outcome score from 40 to 64 to assist with reaching prior level of function. [] Progressing: [] Met: [] Not Met: [] Adjusted  2. Patient will demonstrate increased AROM  to allow for proper joint functioning as indicated by patients Functional Deficits. [] Progressing: [] Met: [] Not Met: [] Adjusted  3. Patient will demonstrate an increase in Strength to demonstrate good gait pattern for proper functional mobility as indicated by patients Functional Deficits. [] Progressing: [] Met: [] Not Met: [] Adjusted  4. Patient will return to usual functional activities without increased symptoms or restriction. [] Progressing: [] Met: [] Not Met: [] Adjusted       ASSESSMENT:  See eval    Treatment/Activity Tolerance:  [x] Patient tolerated treatment well [] Patient limited by fatique  [] Patient limited by pain  [] Patient limited by other medical complications  [] Other:     Overall Progression Towards Functional goals/ Treatment Progress Update:  [] Patient is progressing as expected towards functional goals listed. [] Progression is slowed due to complexities/Impairments listed.   [] Progression has been slowed due to co-morbidities. [x] Plan just implemented, too soon to assess goals progression <30days   [] Goals require adjustment due to lack of progress  [] Patient is not progressing as expected and requires additional follow up with physician  [] Other    Prognosis for POC: [x] Good [] Fair  [] Poor    Patient requires continued skilled intervention: [x] Yes  [] No        PLAN:   [] Continue per plan of care [] Alter current plan (see comments)  [x] Plan of care initiated [] Hold pending MD visit [] Discharge    Electronically signed by: Clyde Leal PI#[id number]    Note: If patient does not return for scheduled/recommended follow up visits, this note will serve as a discharge from care along with the most recent update on progress.

## 2022-12-20 NOTE — PLAN OF CARE
[facility] [place] [facility], [facility]  [address] [facility] [place], [facility]  Phone: [phone]   Fax:     [phone]                                                       Physical Therapy Certification    Dear Kavitha Leyva MD,    We had the pleasure of evaluating the following patient for physical therapy services at [facility]. A summary of our findings can be found in the initial assessment below. This includes our plan of care. If you have any questions or concerns regarding these findings, please do not hesitate to contact me at the office phone number checked above.   Thank you for the referral.       Physician Signature:_______________________________Date:__________________  By signing above (or electronic signature), therapists plan is approved by physician        Patient: Crystal Merino   : 1970   MRN: [id number]  Referring Physician: Kavitha Leyva MD      Evaluation Date: 2022      Medical Diagnosis Information:  Medical Diagnosis: Acute medial meniscus tear of right knee, initial encounter [S83.241A]   Treatment Diagnosis: Decreased Knee strength / ROM                                         Insurance information: PT Insurance Information: UMR    Precautions/ Contra-indications:   Latex Allergy:  [x]NO      []YES  Preferred Language for Healthcare:   [x]English       []other:    C-SSRS Triggered by Intake questionnaire (Past 2 wk assessment ):   [x] No, Questionnaire did not trigger screening.   [] Yes, Patient intake triggered C-SSRS Screening      [] C-SSRS Screening completed  [] PCP notified via Epic     SUBJECTIVE: Patient stated complaint: C/o pain and weakness in R knee since a fall ~ 8 yrs ago    Relevant Medical History:Additional Pertinent Hx: Arthritis, Morbid Obesity, Depression  Functional Scale/Score: FOTO = 40     Pain Scale: Ranges 2-7/10  Easing factors: Rest  Provocative factors: Activity     Type: []Constant   [x]Intermittent  []Radiating []Localized []other:     Numbness/Tingling: R LE    Living Status/Prior Level of Function: Independent with ADLs     OBJECTIVE:   Palpation: TTP palpation inferior / medial aspect of R Knee    Functional Mobility/Transfers: Independent    Posture: Good    Bandages/Dressings/Incisions: NA    Gait: (include devices/WB status) Amb independently without any assistive device, but with slight decreased push-off on R foot    ROM LEFT RIGHT   HIP Flex     HIP Abd     HIP Ext     HIP IR     HIP ER     Knee ext 0 0   Knee Flex 105 100   Ankle PF     Ankle DF     Ankle In     Ankle Ev     Strength  LEFT RIGHT   HIP Flexors 5 5   HIP Abductors 5 5   HIP Ext 5 5   Hip ER     Knee EXT (quad) 5 4   Knee Flex (HS) 5 4+   Ankle DF 5 5   Ankle PF 5 4   Ankle Inv     Ankle EV          Circumference  Mid apex  7 cm prox               MRI report 2019   CONCLUSION:   1. Complex tear with radial and horizontal components involving the posterior horn and midbody    of the lateral meniscus with the horizontal tear extending into the anterior horn. 2. 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 3. Patella connie with patellar chondromalacia. 4. Small knee joint effusion with synovial thickening involving the suprapatellar recess. 5. Superficial venous varicosities are observed, particularly lateral to the knee joint. Reflexes/Sensation:    [x]Dermatomes/Myotomes intact    [x]Reflexes equal and normal bilaterally   []Other:    Joint mobility:    []Normal    []Hypo   []Hyper    Orthopedic Special Tests:                        [x] Patient history, allergies, meds reviewed. Medical chart reviewed. See intake form.      Review Of Systems (ROS):  [x]Performed Review of systems (Integumentary, CardioPulmonary, Neurological) by intake and observation. Intake form has been scanned into medical record. Patient has been instructed to contact their primary care physician regarding ROS issues if not already being addressed at this time. Co-morbidities/Complexities (which will affect course of rehabilitation):   []None           Arthritic conditions   []Rheumatoid arthritis (M05.9)  [x]Osteoarthritis (M19.91)   Cardiovascular conditions   []Hypertension (I10)  []Hyperlipidemia (E78.5)  []Angina pectoris (I20)  []Atherosclerosis (I70)  []CVA Musculoskeletal conditions   []Disc pathology   []Congenital spine pathologies   []Prior surgical intervention  []Osteoporosis (M81.8)  []Osteopenia (M85.8)   Endocrine conditions   []Hypothyroid (E03.9)  []Hyperthyroid Gastrointestinal conditions   []Constipation (M35.37)   Metabolic conditions   [x]Morbid obesity (E66.01)  []Diabetes type 1(E10.65) or 2 (E11.65)   []Neuropathy (G60.9)     Pulmonary conditions   []Asthma (J45)  []Coughing   []COPD (J44.9)   Psychological Disorders  []Anxiety (F41.9)  [x]Depression (F32.9)   []Other:   []Other:          Barriers to/and or personal factors that will affect rehab potential:              []Age  []Sex    []Smoker              []Motivation/Lack of Motivation                        [x]Co-Morbidities              []Cognitive Function, education/learning barriers              []Environmental, home barriers              []profession/work barriers  []past PT/medical experience  []other:  Justification:     Falls Risk Assessment (30 days):   [x] Falls Risk assessed and no intervention required.   [] Falls Risk assessed and Patient requires intervention due to being higher risk   TUG score (>12s at risk):     [] Falls education provided, including         ASSESSMENT:   Functional Impairments:     []Noted lumbar/proximal hip/LE hypomobility   [x]Decreased LE functional ROM   []Decreased core/proximal hip strength and neuromuscular control   [x]Decreased LE functional strength   []Reduced balance/proprioceptive control   []other:      Functional Activity Limitations (from functional questionnaire and intake)   []Reduced ability to tolerate prolonged functional positions   []Reduced ability or difficulty with changes of positions or transfers between positions   []Reduced ability to maintain good posture and demonstrate good body mechanics with sitting, bending, and lifting   []Reduced ability to sleep   [] Reduced ability or tolerance with driving and/or computer work   []Reduced ability to perform lifting, carrying tasks   [x]Reduced ability to squat   []Reduced ability to forward bend   [x]Reduced ability to ambulate prolonged functional periods/distances/surfaces   [x]Reduced ability to ascend/descend stairs   [x]Reduced ability to run, hop or jump   []other:     Participation Restrictions   []Reduced participation in self care activities   [x]Reduced participation in home management activities   [x]Reduced participation in work activities   [x]Reduced participation in social activities. [x]Reduced participation in sport activities. Classification :    []Signs/symptoms consistent with post-surgical status including decreased ROM, strength and function.    []Signs/symptoms consistent with joint sprain/strain   []Signs/symptoms consistent with patella-femoral syndrome   [x]Signs/symptoms consistent with knee OA/hip OA   [x]Signs/symptoms consistent with internal derangement of knee/Hip   []Signs/symptoms consistent with functional hip weakness/NMR control      []Signs/symptoms consistent with tendinitis/tendinosis    []signs/symptoms consistent with pathology which may benefit from Dry needling      []other:      Prognosis/Rehab Potential:      []Excellent   [x]Good    []Fair   []Poor    Tolerance of evaluation/treatment:    []Excellent   [x]Good    []Fair   []Poor    Physical Therapy Evaluation Complexity Justification  [x] A history of present problem with:  [] no personal factors and/or comorbidities that impact the plan of care;  [x]1-2 personal factors and/or comorbidities that impact the plan of care  []3 personal factors and/or comorbidities that impact the plan of care  [x] An examination of body systems using standardized tests and measures addressing any of the following: body structures and functions (impairments), activity limitations, and/or participation restrictions;:  [] a total of 1-2 or more elements   [] a total of 3 or more elements   [] a total of 4 or more elements   [x] A clinical presentation with:  [x] stable and/or uncomplicated characteristics   [] evolving clinical presentation with changing characteristics  [] unstable and unpredictable characteristics;   [x] Clinical decision making of [x] low, [] moderate, [] high complexity using standardized patient assessment instrument and/or measurable assessment of functional outcome. [x] EVAL (LOW) 83839 (typically 20 minutes face-to-face)  [] EVAL (MOD) 56229 (typically 30 minutes face-to-face)  [] EVAL (HIGH) 55806 (typically 45 minutes face-to-face)  [] RE-EVAL     PLAN:  Frequency/Duration:  2 days per week for 8 Weeks:  Interventions:  [x]  Therapeutic exercise including: strength training, ROM, for Lower extremity and core   [x]  NMR activation and proprioception for LE, Glutes and Core   [x]  Manual therapy as indicated for LE, Hip and spine to include: Dry Needling/IASTM, STM, PROM, Gr I-IV mobilizations, manipulation. [x] Modalities as needed that may include: thermal agents, E-stim, Biofeedback, US, iontophoresis as indicated  [x] Patient education on joint protection, postural re-education, activity modification, progression of HEP.   [] Aquatic exercise including: strength training, ROM, and balance for Lower extremity and core     HEP instruction:   Voiced understanding of home exer  GOALS:  Patient stated goal:   [] Progressing: [] Met: [] Not Met: [] Adjusted    Therapist goals for Patient:   Short Term Goals: To be achieved in: 2 weeks  1. Independent in HEP and progression per patient tolerance, in order to prevent re-injury. [] Progressing: [] Met: [] Not Met: [] Adjusted  2. Patient will have a decrease in pain to facilitate improvement in movement, function, and ADLs as indicated by Functional Deficits. [] Progressing: [] Met: [] Not Met: [] Adjusted    Long Term Goals: To be achieved in: 6 weeks  1. Increase FOTO functional outcome score from 40 to 64 to assist with reaching prior level of function. [] Progressing: [] Met: [] Not Met: [] Adjusted  2. Patient will demonstrate increased AROM  to allow for proper joint functioning as indicated by patients Functional Deficits. [] Progressing: [] Met: [] Not Met: [] Adjusted  3. Patient will demonstrate an increase in Strength to demonstrate good gait pattern for proper functional mobility as indicated by patients Functional Deficits. [] Progressing: [] Met: [] Not Met: [] Adjusted  4. Patient will return to usual functional activities without increased symptoms or restriction. [] Progressing: [] Met: [] Not Met: [] Adjusted       Electronically signed by:  Sheri Coley #[id number]      Note: If patient does not return for scheduled/recommended follow up visits, this note will serve as a discharge from care along with the most recent update on progress.

## 2022-12-23 ENCOUNTER — APPOINTMENT (OUTPATIENT)
Dept: PHYSICAL THERAPY | Age: 52
End: 2022-12-23
Payer: COMMERCIAL

## 2022-12-28 ENCOUNTER — HOSPITAL ENCOUNTER (OUTPATIENT)
Dept: PHYSICAL THERAPY | Age: 52
Setting detail: THERAPIES SERIES
Discharge: HOME | End: 2022-12-28
Payer: COMMERCIAL

## 2022-12-28 PROCEDURE — 97110 THERAPEUTIC EXERCISES: CPT

## 2022-12-28 NOTE — FLOWSHEET NOTE
[facility] - Outpatient Rehabilitation and Therapy, [facility]  [address] [place] [facility], [facility]  Phone: [phone]   Fax:     [phone]      Physical Therapy Treatment Note/ Progress Report:     Date:  2022    Patient Name:  Naina Etienne    :  1970  MRN: [id number]    Pertinent Medical History:Additional Pertinent Hx: Arthritis, Morbid Obesity, Depression    Medical/Treatment Diagnosis Information:  Medical Diagnosis: Acute medial meniscus tear of right knee, initial encounter [S83.241A]  Treatment Diagnosis: Decreased Knee strength / ROM    Insurance/Certification information:  PT Insurance Information: UMR  Physician Information:  Raul Nowak MD  Plan of care signed (Y/N): Inbox    Date of Patient follow up with Physician:      Progress Report: []  Yes  [x]  No     Date Range for reporting period:  Beginnin2022  Ending:      Progress report due (10 Rx/or 30 days whichever is less):      Recertification due (POC duration/ or 90 days whichever is less):      Visit # POC/Insurance Allowable Auth Needed    60  (80/20) []Yes   [x]No     Latex Allergy:  [x]NO      []YES  Preferred Language for Healthcare:   [x]English       []Other:    Functional Scale:       Date assessed: at eval  Test:FOTO  Score:    Pain level:  Ranges 4/10     History of Injury:    Knee pain since a fall ~ 8 yrs ago    SUBJECTIVE:  C/o R knee pain and feeling like his knee wants to [employer] on him  22  reports still swollen, give out,  locking. OBJECTIVE:  Observation:   Test measurements:      MRI 2019   CONCLUSION:   1. Complex tear with radial and horizontal components involving the posterior horn and midbody    of the lateral meniscus with the horizontal tear extending into the anterior horn.    2. 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 3. Patella connie with patellar chondromalacia. 4. Small knee joint effusion with synovial thickening involving the suprapatellar recess. 5. Superficial venous varicosities are observed, particularly lateral to the knee joint. RESTRICTIONS/PRECAUTIONS:     Exercises/Interventions:     Therapeutic Ex (10756)   Min: Reps/Resistance Notes/CUES   Nu-step L-0 x 5 min          GSS/HSS 30 sec x 2 ea     HR/TR X 10 ea B         FAQ 0# x 10  Unable to achieve full ext    HS Curl   Yellow x 10 R    Leg press 50 # x 10 B          SLR 2# - 2x10   R    SAQ 4# - 2x10   R         Therapeutic Activity (82776) Min:  Reinforced  use of ice at home         Work on improved \"Push-off: during gait          Gum Drops B balance 30 sec    Rocker Board F/B 30 sec     SLB     BOSU               NMR re-education (55126)  Min:  CUES NEEDED             Manual Intervention ([phone]) Min:                               Modalities  Min:                      Other Therapeutic Activities: Pt was educated on PT POC, Diagnosis, Prognosis, pathomechanics as well as frequency and duration of scheduling future physical therapy appointments. Time was also taken on this day to answer all patient questions and participation in PT. Reviewed appointment policy in detail with patient and patient verbalized understanding. Home Exercise Program: Patient was instructed in the following for HEP:     . Patient verbalized/demonstrated understanding and was issued written handout. Access Code: [email or web address]  URL: [email or web address]  Date: 12/28/2022  Prepared by: Paige Logan    Exercises  Seated Long Arc Quad - 2 x daily - 7 x weekly - 1-2 sets - 10 reps  Short Arc Quad with Ankle Weight - 2 x daily - 7 x weekly - 2 sets - 10 reps - 4# hold  Active Straight Leg Raise with Quad Set - 2 x daily - 7 x weekly - 2 sets - 10 reps - 2# hold  Standing Heel Raise with Support - 2 x daily - 7 x weekly - 1 sets - 10 reps  Standing Ankle Dorsiflexion with Table Support - 2 x daily - 7 x weekly - 1 sets - 10 reps  Gastroc Stretch on Wall - 2 x daily - 7 x weekly - 2 reps - 30 sec hold  Standing Hamstring Stretch with Step - 2 x daily - 7 x weekly - 2 reps - 30 sec hold        Therapeutic Exercise and NMR EXR  [] (11288) Provided verbal/tactile cueing for activities related to strengthening, flexibility, endurance, ROM for improvements in LE, proximal hip, and core control with self care, mobility, lifting, ambulation.  [] (52168) Provided verbal/tactile cueing for activities related to improving balance, coordination, kinesthetic sense, posture, motor skill, proprioception  to assist with LE, proximal hip, and core control in self care, mobility, lifting, ambulation and eccentric single leg control.  [address] and Therapeutic Activities:    [] (68465 or 18960) Provided verbal/tactile cueing for activities related to improving balance, coordination, kinesthetic sense, posture, motor skill, proprioception and motor activation to allow for proper function of core, proximal hip and LE with self care and ADLs and functional mobility.   [] (83262) Gait Re-education- Provided training and instruction to the patient for proper LE, core and proximal hip recruitment and positioning and eccentric body weight control with ambulation re-education including up and down stairs     Home Exercise Program:    [x] (48593) Reviewed/Progressed HEP activities related to strengthening, flexibility, endurance, ROM of core, proximal hip and LE for functional self-care, mobility, lifting and ambulation/stair navigation   [] (54248)Reviewed/Progressed HEP activities related to improving balance, coordination, kinesthetic sense, posture, motor skill, proprioception of core, proximal hip and LE for self care, mobility, lifting, and ambulation/stair navigation      Manual Treatments:  PROM / STM / Oscillations-Mobs:  G-I, II, III, IV (PA's, Inf., Post.)  [] (83867) Provided manual therapy to mobilize LE, proximal hip and/or LS spine soft tissue/joints for the purpose of modulating pain, promoting relaxation,  increasing ROM, reducing/eliminating soft tissue swelling/inflammation/restriction, improving soft tissue extensibility and allowing for proper ROM for normal function with self care, mobility, lifting and ambulation. Approval Dates:  CPT Code Units Approved Units Used  Date Updated:                     Charges:  Timed Code Treatment Minutes: 45   Total Treatment Minutes: 45      [] EVAL (LOW) 85239 (typically 20 minutes face-to-face)  [] EVAL (MOD) 93699 (typically 30 minutes face-to-face)  [] EVAL (HIGH) 31869 (typically 45 minutes face-to-face)  [] RE-EVAL     [x] HV(16292) x   3  [] Dry needle 1 or 2 Muscles (58324)  [] NMR (06882) x     [] Dry needle 3+ Muscles (65863)  [] Manual (42804) x     [] Ultrasound (54608) x  [] TA (35150) x     [] Mech Traction (01592)  [] ES(attended) (33185)     [] ES (un) (68190):   [] Vasopump (78905) [] Ionto (97710)   [] Other:    GOALS:  Patient stated goal: Feeling good support in my knee   [] Progressing: [] Met: [] Not Met: [] Adjusted    Therapist goals for Patient:   Short Term Goals: To be achieved in: 2 weeks  1. Independent in HEP and progression per patient tolerance, in order to prevent re-injury. [] Progressing: [] Met: [] Not Met: [] Adjusted  2. Patient will have a decrease in pain to facilitate improvement in movement, function, and ADLs as indicated by Functional Deficits. [] Progressing: [] Met: [] Not Met: [] Adjusted    Long Term Goals: To be achieved in: 6 weeks  1. Increase FOTO functional outcome score from 40 to 64 to assist with reaching prior level of function. [] Progressing: [] Met: [] Not Met: [] Adjusted  2. Patient will demonstrate increased AROM  to allow for proper joint functioning as indicated by patients Functional Deficits. [] Progressing: [] Met: [] Not Met: [] Adjusted  3. Patient will demonstrate an increase in Strength to demonstrate good gait pattern for proper functional mobility as indicated by patients Functional Deficits. [] Progressing: [] Met: [] Not Met: [] Adjusted  4. Patient will return to usual functional activities without increased symptoms or restriction. [] Progressing: [] Met: [] Not Met: [] Adjusted       ASSESSMENT:    12-28-22 tolerated above ex w/o increased complaint. Treatment/Activity Tolerance:  [x] Patient tolerated treatment well [] Patient limited by fatique  [] Patient limited by pain  [] Patient limited by other medical complications  [] Other:     Overall Progression Towards Functional goals/ Treatment Progress Update:  [] Patient is progressing as expected towards functional goals listed. [] Progression is slowed due to complexities/Impairments listed. [] Progression has been slowed due to co-morbidities. [x] Plan just implemented, too soon to assess goals progression <30days   [] Goals require adjustment due to lack of progress  [] Patient is not progressing as expected and requires additional follow up with physician  [] Other    Prognosis for POC: [x] Good [] Fair  [] Poor    Patient requires continued skilled intervention: [x] Yes  [] No        PLAN:   [] Continue per plan of care [] Alter current plan (see comments)  [x] Plan of care initiated [] Hold pending MD visit [] Discharge    Electronically signed by: Afshan Carrera,     Note: If patient does not return for scheduled/recommended follow up visits, this note will serve as a discharge from care along with the most recent update on progress.

## 2022-12-30 ENCOUNTER — HOSPITAL ENCOUNTER (OUTPATIENT)
Dept: PHYSICAL THERAPY | Age: 52
Setting detail: THERAPIES SERIES
Discharge: HOME | End: 2022-12-30
Payer: COMMERCIAL

## 2022-12-30 PROCEDURE — 97110 THERAPEUTIC EXERCISES: CPT

## 2022-12-30 NOTE — FLOWSHEET NOTE
[facility] - Outpatient Rehabilitation and Therapy, [facility]  [address] [facility], [facility]  Phone: [phone]   Fax:     [phone]      Physical Therapy Treatment Note/ Progress Report:     Date:  2022    Patient Name:  Analilia Dumont    :  1970  MRN: [id number]    Pertinent Medical History:Additional Pertinent Hx: Arthritis, Morbid Obesity, Depression    Medical/Treatment Diagnosis Information:  Medical Diagnosis: Acute medial meniscus tear of right knee, initial encounter [S83.241A]  Treatment Diagnosis: Decreased Knee strength / ROM    Insurance/Certification information:  PT Insurance Information: UMR  Physician Information:  Nadira Gonzales MD  Plan of care signed (Y/N): Inbox    Date of Patient follow up with Physician:      Progress Report: []  Yes  [x]  No     Date Range for reporting period:  Beginnin2022  Ending:      Progress report due (10 Rx/or 30 days whichever is less):      Recertification due (POC duration/ or 90 days whichever is less):      Visit # POC/Insurance Allowable Auth Needed   3 / 16 60  (80/20) []Yes   [x]No     Latex Allergy:  [x]NO      []YES  Preferred Language for Healthcare:   [x]English       []Other:    Functional Scale:       Date assessed: at eval  Test:FOTO  Score:    Pain level:  Ranges 2-3 /10     History of Injury:    Knee pain since a fall ~ 8 yrs ago    SUBJECTIVE:  C/o R knee pain and feeling like his knee wants to [employer] on him  -  reports still swollen, give out,  locking.  -  about the same in terms of locking and giving out. A bit less pain due to light duty    OBJECTIVE:     Height 6'  347#    MRI 2019   CONCLUSION:   1. Complex tear with radial and horizontal components involving the posterior horn and midbody    of the lateral meniscus with the horizontal tear extending into the anterior horn.    2. 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 3. Patella connie with patellar chondromalacia. 4. Small knee joint effusion with synovial thickening involving the suprapatellar recess. 5. Superficial venous varicosities are observed, particularly lateral to the knee joint. RESTRICTIONS/PRECAUTIONS:     Exercises/Interventions:     Therapeutic Ex (25995)   Min:  40 Reps/Resistance Notes/CUES   Nu-step L-1 x 5 min          GSS/HSS 30 sec x 2 ea     HR/TR  Fitter X 10 ea B  X 10 F/B ROM as evan         FAQ 2# 2 x 10 Unable to achieve full ext    HS Curl   Red 2 x 10 R    Leg press seat  8  50 # x 10 B          SLR 0# - 2x10   R Progress to 1-2# was too much last time at 2#   SAQ 4# - 2x10   R    Bridge  Clamshell           2 x 10  2 x 10    Therapeutic Activity (02853) Min:   5 Reinforced  use of ice at home    Step ups 2\" x 10 R          Work on improved \"Push-off: during gait          Resume next time      SLB     BOSU Check weight limit on bosu              NMR re-education (50851)  Min:  CUES NEEDED             Manual Intervention (51298) Min:                               Modalities  Min:                      Other Therapeutic Activities: Pt was educated on PT POC, Diagnosis, Prognosis, pathomechanics as well as frequency and duration of scheduling future physical therapy appointments. Time was also taken on this day to answer all patient questions and participation in PT. Reviewed appointment policy in detail with patient and patient verbalized understanding. Home Exercise Program: Patient was instructed in the following for HEP:     . Patient verbalized/demonstrated understanding and was issued written handout. Access Code: [email or web address]  URL: [email or web address]  Date: 12/28/2022  Prepared by: Didi Ernandez    Exercises  Seated Long Arc Quad - 2 x daily - 7 x weekly - 1-2 sets - 10 reps  Short Arc Quad with Ankle Weight - 2 x daily - 7 x weekly - 2 sets - 10 reps - 4# hold  Active Straight Leg Raise with Quad Set - 2 x daily - 7 x weekly - 2 sets - 10 reps - 2# hold  Standing Heel Raise with Support - 2 x daily - 7 x weekly - 1 sets - 10 reps  Standing Ankle Dorsiflexion with Table Support - 2 x daily - 7 x weekly - 1 sets - 10 reps  Gastroc Stretch on Wall - 2 x daily - 7 x weekly - 2 reps - 30 sec hold  Standing Hamstring Stretch with Step - 2 x daily - 7 x weekly - 2 reps - 30 sec hold        Therapeutic Exercise and NMR EXR  [] (51910) Provided verbal/tactile cueing for activities related to strengthening, flexibility, endurance, ROM for improvements in LE, proximal hip, and core control with self care, mobility, lifting, ambulation.  [] (68854) Provided verbal/tactile cueing for activities related to improving balance, coordination, kinesthetic sense, posture, motor skill, proprioception  to assist with LE, proximal hip, and core control in self care, mobility, lifting, ambulation and eccentric single leg control.  [address] and Therapeutic Activities:    [] (54232 or 53944) Provided verbal/tactile cueing for activities related to improving balance, coordination, kinesthetic sense, posture, motor skill, proprioception and motor activation to allow for proper function of core, proximal hip and LE with self care and ADLs and functional mobility.   [] (29135) Gait Re-education- Provided training and instruction to the patient for proper LE, core and proximal hip recruitment and positioning and eccentric body weight control with ambulation re-education including up and down stairs     Home Exercise Program:    [x] (11696) Reviewed/Progressed HEP activities related to strengthening, flexibility, endurance, ROM of core, proximal hip and LE for functional self-care, mobility, lifting and ambulation/stair navigation   [] (98762)Reviewed/Progressed HEP activities related to improving balance, coordination, kinesthetic sense, posture, motor skill, proprioception of core, proximal hip and LE for self care, mobility, lifting, and ambulation/stair navigation      Manual Treatments:  PROM / STM / Oscillations-Mobs:  G-I, II, III, IV (PA's, Inf., Post.)  [] (39176) Provided manual therapy to mobilize LE, proximal hip and/or LS spine soft tissue/joints for the purpose of modulating pain, promoting relaxation,  increasing ROM, reducing/eliminating soft tissue swelling/inflammation/restriction, improving soft tissue extensibility and allowing for proper ROM for normal function with self care, mobility, lifting and ambulation. Charges:  Timed Code Treatment Minutes: 45   Total Treatment Minutes: 45      [] EVAL (LOW) 39928 (typically 20 minutes face-to-face)  [] EVAL (MOD) 01060 (typically 30 minutes face-to-face)  [] EVAL (HIGH) 30027 (typically 45 minutes face-to-face)  [] RE-EVAL     [x] RE(14483) x   3  [] Dry needle 1 or 2 Muscles (06745)  [] NMR (35581) x     [] Dry needle 3+ Muscles (33823)  [] Manual (66332) x     [] Ultrasound (63538) x  [] TA (41280) x     [] Mech Traction (86113)  [] ES(attended) (79615)     [] ES (un) (97859):   [] Vasopump (78595) [] Ionto (54582)   [] Other:    GOALS:  Patient stated goal: Feeling good support in my knee   [] Progressing: [] Met: [] Not Met: [] Adjusted    Therapist goals for Patient:   Short Term Goals: To be achieved in: 2 weeks  1. Independent in HEP and progression per patient tolerance, in order to prevent re-injury. [] Progressing: [] Met: [] Not Met: [] Adjusted  2. Patient will have a decrease in pain to facilitate improvement in movement, function, and ADLs as indicated by Functional Deficits. [] Progressing: [] Met: [] Not Met: [] Adjusted    Long Term Goals: To be achieved in: 6 weeks  1. Increase FOTO functional outcome score from 40 to 64 to assist with reaching prior level of function. [] Progressing: [] Met: [] Not Met: [] Adjusted  2.  Patient will demonstrate increased AROM  to allow for proper joint functioning as indicated by patients Functional Deficits. [] Progressing: [] Met: [] Not Met: [] Adjusted  3. Patient will demonstrate an increase in Strength to demonstrate good gait pattern for proper functional mobility as indicated by patients Functional Deficits. [] Progressing: [] Met: [] Not Met: [] Adjusted  4. Patient will return to usual functional activities without increased symptoms or restriction. [] Progressing: [] Met: [] Not Met: [] Adjusted       ASSESSMENT:    12-28-22 tolerated above ex w/o increased complaint. 12/30 -  Notes SLR made him sore - will all weight slowly for those - increased ex. Treatment/Activity Tolerance:  [x] Patient tolerated treatment well [] Patient limited by fatique  [] Patient limited by pain  [] Patient limited by other medical complications  [] Other:     Overall Progression Towards Functional goals/ Treatment Progress Update:  [] Patient is progressing as expected towards functional goals listed. [] Progression is slowed due to complexities/Impairments listed. [] Progression has been slowed due to co-morbidities. [x] Plan just implemented, too soon to assess goals progression <30days   [] Goals require adjustment due to lack of progress  [] Patient is not progressing as expected and requires additional follow up with physician  [] Other    Prognosis for POC: [x] Good [] Fair  [] Poor    Patient requires continued skilled intervention: [x] Yes  [] No        PLAN:   [] Continue per plan of care [] Alter current plan (see comments)  [x] Plan of care initiated [] Hold pending MD visit [] Discharge    Electronically signed by: Navya Batista, PT [id number]    Note: If patient does not return for scheduled/recommended follow up visits, this note will serve as a discharge from care along with the most recent update on progress.

## 2022-12-31 DIAGNOSIS — R60.0 BILATERAL LOWER EXTREMITY EDEMA: ICD-10-CM

## 2023-01-03 RX ORDER — FUROSEMIDE 20 MG/1
20 TABLET ORAL DAILY
Qty: 30 TABLET | Refills: 0 | Status: SHIPPED | OUTPATIENT
Start: 2023-01-03

## 2023-01-04 ENCOUNTER — HOSPITAL ENCOUNTER (OUTPATIENT)
Dept: PHYSICAL THERAPY | Age: 53
Setting detail: THERAPIES SERIES
Discharge: HOME | End: 2023-01-04
Payer: COMMERCIAL

## 2023-01-04 PROCEDURE — 97110 THERAPEUTIC EXERCISES: CPT

## 2023-01-04 NOTE — FLOWSHEET NOTE
[facility] - Outpatient Rehabilitation and Therapy, [facility]  [address] [facility], [facility]  Phone: [phone]   Fax:     [phone]      Physical Therapy Treatment Note/ Progress Report:     Date:  2023    Patient Name:  Flako Locke    :  1970  MRN: [id number]    Pertinent Medical History:Additional Pertinent Hx: Arthritis, Morbid Obesity, Depression    Medical/Treatment Diagnosis Information:  Medical Diagnosis: Acute medial meniscus tear of right knee, initial encounter [S83.241A]  Treatment Diagnosis: Decreased Knee strength / ROM    Insurance/Certification information:  PT Insurance Information: UMR  Physician Information:  Natalie Cordoba MD  Plan of care signed (Y/N): Inbox    Date of Patient follow up with Physician:      Progress Report: []  Yes  [x]  No     Date Range for reporting period:  Beginnin2023  Ending:      Progress report due (10 Rx/or 30 days whichever is less):      Recertification due (POC duration/ or 90 days whichever is less):      Visit # POC/Insurance Allowable Auth Needed    60  (80/20) []Yes   [x]No     Latex Allergy:  [x]NO      []YES  Preferred Language for Healthcare:   [x]English       []Other:    Functional Scale:       Date assessed: at eval  Test:FOTO  Score:    Pain level:  Ranges  1-4 /10     History of Injury:    Knee pain since a fall ~ 8 yrs ago    SUBJECTIVE:  C/o R knee pain and feeling like his knee wants to [employer] on him  22  reports still swollen, give out,  locking.  -  about the same in terms of locking and giving out. A bit less pain due to light duty  23 overall doing better, especially in mornings. Pain increases as day progresses. 30 min late due to traffic/ roads closed. OBJECTIVE:     Height 6'  347#    MRI 2019   CONCLUSION:   1.  Complex tear with radial and horizontal components involving the posterior horn and midbody of the lateral meniscus with the horizontal tear extending into the anterior horn. 2. 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 3. Patella connie with patellar chondromalacia. 4. Small knee joint effusion with synovial thickening involving the suprapatellar recess. 5. Superficial venous varicosities are observed, particularly lateral to the knee joint. RESTRICTIONS/PRECAUTIONS:     Exercises/Interventions:     Therapeutic Ex (00830)   Min:  40 Reps/Resistance Notes/CUES   Nu-step L-1 x 5 min          GSS/HSS 30 sec x 2 ea     HR/TR  Fitter    1 thin  X 10 ea B  X 10 F/B    ROM as evan         FAQ 2# 2 x 10 R Unable to achieve full ext    HS Curl   Red 2 x 10 R    Leg press seat  8  50 # x 10 B  Increase reps         SLR 1# - 2x10   R Evan 1 # ok    SAQ 4# - 2x10   R    Bridge  Clamshell           2 x 10  2 x 10 R    Therapeutic Activity (85557) Min:   5     Step ups 4\" x 10 R          Work on improved \"Push-off: during gait          Gum Drops B balance 30 sec  Resume next time   Rocker Board F/B 30 sec     SLB      weight limit on bosu  300#              NMR re-education (57192)  Min:  CUES NEEDED             Manual Intervention (99223) Min:                               Modalities  Min:                      Other Therapeutic Activities: Pt was educated on PT POC, Diagnosis, Prognosis, pathomechanics as well as frequency and duration of scheduling future physical therapy appointments. Time was also taken on this day to answer all patient questions and participation in PT. Reviewed appointment policy in detail with patient and patient verbalized understanding. Home Exercise Program: Patient was instructed in the following for HEP:     . Patient verbalized/demonstrated understanding and was issued written handout. Access Code: [email or web address]  URL: [email or web address]  Date: 12/28/2022  Prepared by: Epifanio Light    Exercises  Seated Long Arc Quad - 2 x daily - 7 x weekly - 1-2 sets - 10 reps  Short Arc Quad with Ankle Weight - 2 x daily - 7 x weekly - 2 sets - 10 reps - 4# hold  Active Straight Leg Raise with Quad Set - 2 x daily - 7 x weekly - 2 sets - 10 reps - 2# hold  Standing Heel Raise with Support - 2 x daily - 7 x weekly - 1 sets - 10 reps  Standing Ankle Dorsiflexion with Table Support - 2 x daily - 7 x weekly - 1 sets - 10 reps  Gastroc Stretch on Wall - 2 x daily - 7 x weekly - 2 reps - 30 sec hold  Standing Hamstring Stretch with Step - 2 x daily - 7 x weekly - 2 reps - 30 sec hold        Therapeutic Exercise and NMR EXR  [] (55842) Provided verbal/tactile cueing for activities related to strengthening, flexibility, endurance, ROM for improvements in LE, proximal hip, and core control with self care, mobility, lifting, ambulation.  [] (91270) Provided verbal/tactile cueing for activities related to improving balance, coordination, kinesthetic sense, posture, motor skill, proprioception  to assist with LE, proximal hip, and core control in self care, mobility, lifting, ambulation and eccentric single leg control.  [facility] and Therapeutic Activities:    [] (87766 or 94133) Provided verbal/tactile cueing for activities related to improving balance, coordination, kinesthetic sense, posture, motor skill, proprioception and motor activation to allow for proper function of core, proximal hip and LE with self care and ADLs and functional mobility.   [] (58049) Gait Re-education- Provided training and instruction to the patient for proper LE, core and proximal hip recruitment and positioning and eccentric body weight control with ambulation re-education including up and down stairs     Home Exercise Program:    [x] (86915) Reviewed/Progressed HEP activities related to strengthening, flexibility, endurance, ROM of core, proximal hip and LE for functional self-care, mobility, lifting and ambulation/stair navigation   [] (20390)Reviewed/Progressed HEP activities related to improving balance, coordination, kinesthetic sense, posture, motor skill, proprioception of core, proximal hip and LE for self care, mobility, lifting, and ambulation/stair navigation      Manual Treatments:  PROM / STM / Oscillations-Mobs:  G-I, II, III, IV (PA's, Inf., Post.)  [] (98628) Provided manual therapy to mobilize LE, proximal hip and/or LS spine soft tissue/joints for the purpose of modulating pain, promoting relaxation,  increasing ROM, reducing/eliminating soft tissue swelling/inflammation/restriction, improving soft tissue extensibility and allowing for proper ROM for normal function with self care, mobility, lifting and ambulation. Charges:  Timed Code Treatment Minutes: 40   Total Treatment Minutes: 40      [] EVAL (LOW) 19766 (typically 20 minutes face-to-face)  [] EVAL (MOD) 20105 (typically 30 minutes face-to-face)  [] EVAL (HIGH) 65014 (typically 45 minutes face-to-face)  [] RE-EVAL     [x] CD(10859) x   3  [] Dry needle 1 or 2 Muscles (54304)  [] NMR (24836) x     [] Dry needle 3+ Muscles (41572)  [] Manual (60653) x     [] Ultrasound (75449) x  [] TA (48424) x     [] Mech Traction (29122)  [] ES(attended) (45803)     [] ES (un) (66819):   [] Vasopump (97466) [] Ionto (19856)   [] Other:    GOALS:  Patient stated goal: Feeling good support in my knee   [] Progressing: [] Met: [] Not Met: [] Adjusted    Therapist goals for Patient:   Short Term Goals: To be achieved in: 2 weeks  1. Independent in HEP and progression per patient tolerance, in order to prevent re-injury. [] Progressing: [] Met: [] Not Met: [] Adjusted  2. Patient will have a decrease in pain to facilitate improvement in movement, function, and ADLs as indicated by Functional Deficits. [] Progressing: [] Met: [] Not Met: [] Adjusted    Long Term Goals: To be achieved in: 6 weeks  1.  Increase FOTO functional outcome score from 40 to 64 to assist with reaching prior level of function. [] Progressing: [] Met: [] Not Met: [] Adjusted  2. Patient will demonstrate increased AROM  to allow for proper joint functioning as indicated by patients Functional Deficits. [] Progressing: [] Met: [] Not Met: [] Adjusted  3. Patient will demonstrate an increase in Strength to demonstrate good gait pattern for proper functional mobility as indicated by patients Functional Deficits. [] Progressing: [] Met: [] Not Met: [] Adjusted  4. Patient will return to usual functional activities without increased symptoms or restriction. [] Progressing: [] Met: [] Not Met: [] Adjusted       ASSESSMENT:    12-28-22 tolerated above ex w/o increased complaint. 12/30 -  Notes SLR made him sore - will all weight slowly for those - increased ex.  1-4-23 tolerated above ok          Treatment/Activity Tolerance:  [x] Patient tolerated treatment well [] Patient limited by fatique  [] Patient limited by pain  [] Patient limited by other medical complications  [] Other:     Overall Progression Towards Functional goals/ Treatment Progress Update:  [] Patient is progressing as expected towards functional goals listed. [] Progression is slowed due to complexities/Impairments listed. [] Progression has been slowed due to co-morbidities.   [x] Plan just implemented, too soon to assess goals progression <30days   [] Goals require adjustment due to lack of progress  [] Patient is not progressing as expected and requires additional follow up with physician  [] Other    Prognosis for POC: [x] Good [] Fair  [] Poor    Patient requires continued skilled intervention: [x] Yes  [] No        PLAN:   [] Continue per plan of care [] Alter current plan (see comments)  [x] Plan of care initiated [] Hold pending MD visit [] Discharge    Electronically signed by: Romy Johnson,     Note: If patient does not return for scheduled/recommended follow up visits, this note will serve as a discharge from care along with the most recent update on progress.

## 2023-01-09 ENCOUNTER — HOSPITAL ENCOUNTER (OUTPATIENT)
Dept: PHYSICAL THERAPY | Age: 53
Setting detail: THERAPIES SERIES
Discharge: HOME | End: 2023-01-09
Payer: COMMERCIAL

## 2023-01-09 PROCEDURE — 97110 THERAPEUTIC EXERCISES: CPT

## 2023-01-09 NOTE — FLOWSHEET NOTE
[facility] - Outpatient Rehabilitation and Therapy, [facility]  [address] [facility]  Phone: [phone]   Fax:     [phone]      Physical Therapy Treatment Note/ Progress Report:     Date:  2023    Patient Name:  Ric Pruett    :  1970  MRN: [id number]    Pertinent Medical History:Additional Pertinent Hx: Arthritis, Morbid Obesity, Depression    Medical/Treatment Diagnosis Information:  Medical Diagnosis: Acute medial meniscus tear of right knee, initial encounter [S83.241A]  Treatment Diagnosis: Decreased Knee strength / ROM    Insurance/Certification information:  PT Insurance Information: UMR  Physician Information:  Maliha Bustillos MD  Plan of care signed (Y/N): Inbox    Date of Patient follow up with Physician:  no apt made     Progress Report: []  Yes  [x]  No     Date Range for reporting period:  Beginnin2023  Ending:      Progress report due (10 Rx/or 30 days whichever is less):      Recertification due (POC duration/ or 90 days whichever is less):      Visit # POC/Insurance Allowable Auth Needed    60  (80/20) []Yes   [x]No     Latex Allergy:  [x]NO      []YES  Preferred Language for Healthcare:   [x]English       []Other:    Functional Scale:       Date assessed: at eval  Test:FOTO  Score:    Pain level:  Ranges  2  /10     History of Injury:    Knee pain since a fall ~ 8 yrs ago    SUBJECTIVE:  C/o R knee pain and feeling like his knee wants to [employer] on him  22  reports still swollen, give out,  locking.  -  about the same in terms of locking and giving out. A bit less pain due to light duty  23 overall doing better, especially in mornings. Pain increases as day progresses. 30 min late due to traffic/ roads closed. 23 reports needs to make certain movements to be able to straighten it. Reports pain/ buckling less.                OBJECTIVE:     Height 6'  347#    MRI 2019   CONCLUSION:   1. Complex tear with radial and horizontal components involving the posterior horn and midbody    of the lateral meniscus with the horizontal tear extending into the anterior horn. 2. 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 3. Patella connie with patellar chondromalacia. 4. Small knee joint effusion with synovial thickening involving the suprapatellar recess. 5. Superficial venous varicosities are observed, particularly lateral to the knee joint. RESTRICTIONS/PRECAUTIONS:     Exercises/Interventions:     Therapeutic Ex (47721)   Min:  40 Reps/Resistance Notes/CUES   Nu-step L-1 x 5 min          GSS B/HSS R 30 sec x 2 ea     HR/TR  Fitter    1 thin  X 10 ea B  X 10 F/B    ROM as evan         FAQ 2# 2 x 10 R End ROM ext  improving   HS Curl   22# x 10 R Increase reps as evan   Leg press seat  8  60 #  2 x [address] wt if evan         SLR 1# - 2x10   R    SAQ 4# - 2x10   R    Bridge  Clamshell           2 x 10  2 x 10 R    Therapeutic Activity (69507) Min:   5     Step ups 6\" x 10 R          Work on improved \"Push-off: during gait          Gum Drops B balance 30 sec     Rocker Board F/B, S/S 30 sec  Touch as needed   SLB 20 sec x 1 R Touch as needed    weight limit on bosu  300#              NMR re-education (31624)  Min:  CUES NEEDED             Manual Intervention (98995) Min:                               Modalities  Min:                      Other Therapeutic Activities: Pt was educated on PT POC, Diagnosis, Prognosis, pathomechanics as well as frequency and duration of scheduling future physical therapy appointments. Time was also taken on this day to answer all patient questions and participation in PT. Reviewed appointment policy in detail with patient and patient verbalized understanding.      Home Exercise Program: Patient was instructed in the following for HEP:     . Patient verbalized/demonstrated understanding and was issued written handout. Access Code: [email or web address]  URL: [email or web address]  Date: 12/28/2022  Prepared by: Bonnie Gonzalez    Exercises  Seated Long Arc Quad - 2 x daily - 7 x weekly - 1-2 sets - 10 reps  Short Arc Quad with Ankle Weight - 2 x daily - 7 x weekly - 2 sets - 10 reps - 4# hold  Active Straight Leg Raise with Quad Set - 2 x daily - 7 x weekly - 2 sets - 10 reps - 2# hold  Standing Heel Raise with Support - 2 x daily - 7 x weekly - 1 sets - 10 reps  Standing Ankle Dorsiflexion with Table Support - 2 x daily - 7 x weekly - 1 sets - 10 reps  Gastroc Stretch on Wall - 2 x daily - 7 x weekly - 2 reps - 30 sec hold  Standing Hamstring Stretch with Step - 2 x daily - 7 x weekly - 2 reps - 30 sec hold        Therapeutic Exercise and NMR EXR  [] (52744) Provided verbal/tactile cueing for activities related to strengthening, flexibility, endurance, ROM for improvements in LE, proximal hip, and core control with self care, mobility, lifting, ambulation.  [] (08588) Provided verbal/tactile cueing for activities related to improving balance, coordination, kinesthetic sense, posture, motor skill, proprioception  to assist with LE, proximal hip, and core control in self care, mobility, lifting, ambulation and eccentric single leg control.  [facility] and Therapeutic Activities:    [] (91390 or 73092) Provided verbal/tactile cueing for activities related to improving balance, coordination, kinesthetic sense, posture, motor skill, proprioception and motor activation to allow for proper function of core, proximal hip and LE with self care and ADLs and functional mobility.   [] (77461) Gait Re-education- Provided training and instruction to the patient for proper LE, core and proximal hip recruitment and positioning and eccentric body weight control with ambulation re-education including up and down stairs     Home Exercise Program:    [x] (12782) Reviewed/Progressed HEP activities related to strengthening, flexibility, endurance, ROM of core, proximal hip and LE for functional self-care, mobility, lifting and ambulation/stair navigation   [] (62035)Reviewed/Progressed HEP activities related to improving balance, coordination, kinesthetic sense, posture, motor skill, proprioception of core, proximal hip and LE for self care, mobility, lifting, and ambulation/stair navigation      Manual Treatments:  PROM / STM / Oscillations-Mobs:  G-I, II, III, IV (PA's, Inf., Post.)  [] (17778) Provided manual therapy to mobilize LE, proximal hip and/or LS spine soft tissue/joints for the purpose of modulating pain, promoting relaxation,  increasing ROM, reducing/eliminating soft tissue swelling/inflammation/restriction, improving soft tissue extensibility and allowing for proper ROM for normal function with self care, mobility, lifting and ambulation. Charges:  Timed Code Treatment Minutes: 35   Total Treatment Minutes: 35      [] EVAL (LOW) 29498 (typically 20 minutes face-to-face)  [] EVAL (MOD) 09519 (typically 30 minutes face-to-face)  [] EVAL (HIGH) 97739 (typically 45 minutes face-to-face)  [] RE-EVAL     [x] AA(14529) x   2  [] Dry needle 1 or 2 Muscles (45178)  [] NMR (94390) x     [] Dry needle 3+ Muscles (24825)  [] Manual (27710) x     [] Ultrasound (08586) x  [] TA (75058) x     [] Mech Traction (42729)  [] ES(attended) (47395)     [] ES (un) (74041):   [] Vasopump (47235) [] Ionto (84884)   [] Other:    GOALS:  Patient stated goal: Feeling good support in my knee   [] Progressing: [] Met: [] Not Met: [] Adjusted    Therapist goals for Patient:   Short Term Goals: To be achieved in: 2 weeks  1. Independent in HEP and progression per patient tolerance, in order to prevent re-injury. [] Progressing: [] Met: [] Not Met: [] Adjusted  2.  Patient will have a decrease in pain to facilitate improvement in movement, function, and ADLs as indicated by Functional Deficits. [] Progressing: [] Met: [] Not Met: [] Adjusted    Long Term Goals: To be achieved in: 6 weeks  1. Increase FOTO functional outcome score from 40 to 64 to assist with reaching prior level of function. [] Progressing: [] Met: [] Not Met: [] Adjusted  2. Patient will demonstrate increased AROM  to allow for proper joint functioning as indicated by patients Functional Deficits. [] Progressing: [] Met: [] Not Met: [] Adjusted  3. Patient will demonstrate an increase in Strength to demonstrate good gait pattern for proper functional mobility as indicated by patients Functional Deficits. [] Progressing: [] Met: [] Not Met: [] Adjusted  4. Patient will return to usual functional activities without increased symptoms or restriction. [] Progressing: [] Met: [] Not Met: [] Adjusted       ASSESSMENT:    12-28-22 tolerated above ex w/o increased complaint. 12/30 -  Notes SLR made him sore - will all weight slowly for those - increased ex.  1-4-23 tolerated above ok  1-9-23 gradually progressing           Treatment/Activity Tolerance:  [x] Patient tolerated treatment well [] Patient limited by fatique  [] Patient limited by pain  [] Patient limited by other medical complications  [] Other:     Overall Progression Towards Functional goals/ Treatment Progress Update:  [] Patient is progressing as expected towards functional goals listed. [] Progression is slowed due to complexities/Impairments listed. [] Progression has been slowed due to co-morbidities.   [x] Plan just implemented, too soon to assess goals progression <30days   [] Goals require adjustment due to lack of progress  [] Patient is not progressing as expected and requires additional follow up with physician  [] Other    Prognosis for POC: [x] Good [] Fair  [] Poor    Patient requires continued skilled intervention: [x] Yes  [] No        PLAN:   [] Continue per plan of care [] Alter current plan (see comments)  [x] Plan of care initiated [] Hold pending MD visit [] Discharge    Electronically signed by: Peter Tatum,     Note: If patient does not return for scheduled/recommended follow up visits, this note will serve as a discharge from care along with the most recent update on progress.

## 2023-01-12 ENCOUNTER — HOSPITAL ENCOUNTER (OUTPATIENT)
Dept: PHYSICAL THERAPY | Age: 53
Setting detail: THERAPIES SERIES
Discharge: HOME | End: 2023-01-12
Payer: COMMERCIAL

## 2023-01-12 PROCEDURE — 97110 THERAPEUTIC EXERCISES: CPT | Performed by: CHIROPRACTOR

## 2023-01-12 PROCEDURE — 97530 THERAPEUTIC ACTIVITIES: CPT | Performed by: CHIROPRACTOR

## 2023-01-12 NOTE — FLOWSHEET NOTE
[facility] - Outpatient Rehabilitation and Therapy, [facility]  [address] [place] [facility], [facility]  Phone: [phone]   Fax:     [phone]      Physical Therapy Treatment Note/ Progress Report:     Date:  2023    Patient Name:  Victor Hugo Gongora    :  1970  MRN: [id number]    Pertinent Medical History:Additional Pertinent Hx: Arthritis, Morbid Obesity, Depression    Medical/Treatment Diagnosis Information:  Medical Diagnosis: Acute medial meniscus tear of right knee, initial encounter [S83.241A]  Treatment Diagnosis: Decreased Knee strength / ROM    Insurance/Certification information:  PT Insurance Information: UMR  Physician Information:  Michaela Moon MD  Plan of care signed (Y/N): Inbox    Date of Patient follow up with Physician:  no apt made     Progress Report: []  Yes  [x]  No     Date Range for reporting period:  Beginnin2023  Ending:      Progress report due (10 Rx/or 30 days whichever is less):      Recertification due (POC duration/ or 90 days whichever is less):      Visit # POC/Insurance Allowable Auth Needed    60  (80/20) []Yes   [x]No     Latex Allergy:  [x]NO      []YES  Preferred Language for Healthcare:   [x]English       []Other:    Functional Scale:       Date assessed: at eval  Test:FOTO  Score:    Pain level:  Ranges  2  /10     History of Injury:    Knee pain since a fall ~ 8 yrs ago    SUBJECTIVE:  C/o R knee pain and feeling like his knee wants to [employer] on him  22  reports still swollen, give out,  locking.  -  about the same in terms of locking and giving out. A bit less pain due to light duty  23 overall doing better, especially in mornings. Pain increases as day progresses. 30 min late due to traffic/ roads closed. 23 reports needs to make certain movements to be able to straighten it. Reports pain/ buckling less.                OBJECTIVE:     Height 6'  347#    MRI 2019   CONCLUSION:   1. Complex tear with radial and horizontal components involving the posterior horn and midbody    of the lateral meniscus with the horizontal tear extending into the anterior horn. 2. 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 3. Patella connie with patellar chondromalacia. 4. Small knee joint effusion with synovial thickening involving the suprapatellar recess. 5. Superficial venous varicosities are observed, particularly lateral to the knee joint. RESTRICTIONS/PRECAUTIONS:     Exercises/Interventions:     Therapeutic Ex (90813)   Min:  40 Reps/Resistance Notes/CUES   Nu-step L-1 x 5 min          GSS B/HSS R 30 sec x 2 ea     HR/TR  Fitter    1 thin  X 10 ea B  X 10 F/B    ROM as evan         FAQ 2# 2 x 10 R End ROM ext  improving   HS Curl   22# x 10 R Increase reps as evan   Leg press seat  8  60 #  2 x [email or web address] wt if evan         SLR 1# - 2x10   R    SAQ 4# - 2x10   R    Bridge  Clamshell           2 x 10  2 x 10 R    Therapeutic Activity (99573) Min:   5     Step ups 6\" x 10 R          Work on improved \"Push-off: during gait          Gum Drops B balance 30 sec     Rocker Board F/B, S/S 30 sec  Touch as needed   SLB 20 sec x 1 R Touch as needed    weight limit on bosu  300#              NMR re-education (08977)  Min:  CUES NEEDED             Manual Intervention (77506) Min:                               Modalities  Min:                      Other Therapeutic Activities: Pt was educated on PT POC, Diagnosis, Prognosis, pathomechanics as well as frequency and duration of scheduling future physical therapy appointments. Time was also taken on this day to answer all patient questions and participation in PT. Reviewed appointment policy in detail with patient and patient verbalized understanding.      Home Exercise Program: Patient was instructed in the following for HEP:     . Patient verbalized/demonstrated understanding and was issued written handout. Access Code: [email or web address]  URL: [email or web address]  Date: 12/28/2022  Prepared by: Luis Antonio Gagnon    Exercises  Seated Long Arc Quad - 2 x daily - 7 x weekly - 1-2 sets - 10 reps  Short Arc Quad with Ankle Weight - 2 x daily - 7 x weekly - 2 sets - 10 reps - 4# hold  Active Straight Leg Raise with Quad Set - 2 x daily - 7 x weekly - 2 sets - 10 reps - 2# hold  Standing Heel Raise with Support - 2 x daily - 7 x weekly - 1 sets - 10 reps  Standing Ankle Dorsiflexion with Table Support - 2 x daily - 7 x weekly - 1 sets - 10 reps  Gastroc Stretch on Wall - 2 x daily - 7 x weekly - 2 reps - 30 sec hold  Standing Hamstring Stretch with Step - 2 x daily - 7 x weekly - 2 reps - 30 sec hold        Therapeutic Exercise and NMR EXR  [] (54172) Provided verbal/tactile cueing for activities related to strengthening, flexibility, endurance, ROM for improvements in LE, proximal hip, and core control with self care, mobility, lifting, ambulation.  [] (17277) Provided verbal/tactile cueing for activities related to improving balance, coordination, kinesthetic sense, posture, motor skill, proprioception  to assist with LE, proximal hip, and core control in self care, mobility, lifting, ambulation and eccentric single leg control.  [facility] and Therapeutic Activities:    [] (81870 or 33606) Provided verbal/tactile cueing for activities related to improving balance, coordination, kinesthetic sense, posture, motor skill, proprioception and motor activation to allow for proper function of core, proximal hip and LE with self care and ADLs and functional mobility.   [] (75398) Gait Re-education- Provided training and instruction to the patient for proper LE, core and proximal hip recruitment and positioning and eccentric body weight control with ambulation re-education including up and down stairs     Home Exercise Program:    [x] (18169) Reviewed/Progressed HEP activities related to strengthening, flexibility, endurance, ROM of core, proximal hip and LE for functional self-care, mobility, lifting and ambulation/stair navigation   [] (95451)Reviewed/Progressed HEP activities related to improving balance, coordination, kinesthetic sense, posture, motor skill, proprioception of core, proximal hip and LE for self care, mobility, lifting, and ambulation/stair navigation      Manual Treatments:  PROM / STM / Oscillations-Mobs:  G-I, II, III, IV (PA's, Inf., Post.)  [] (77051) Provided manual therapy to mobilize LE, proximal hip and/or LS spine soft tissue/joints for the purpose of modulating pain, promoting relaxation,  increasing ROM, reducing/eliminating soft tissue swelling/inflammation/restriction, improving soft tissue extensibility and allowing for proper ROM for normal function with self care, mobility, lifting and ambulation. Charges:  Timed Code Treatment Minutes: 35   Total Treatment Minutes: 35      [] EVAL (LOW) 22955 (typically 20 minutes face-to-face)  [] EVAL (MOD) 40164 (typically 30 minutes face-to-face)  [] EVAL (HIGH) 91275 (typically 45 minutes face-to-face)  [] RE-EVAL     [x] LQ(82570) x   2  [] Dry needle 1 or 2 Muscles (28565)  [] NMR (33032) x     [] Dry needle 3+ Muscles (99351)  [] Manual (28364) x     [] Ultrasound (95610) x  [] TA (29951) x     [] Mech Traction (53121)  [] ES(attended) (95351)     [] ES (un) (77703):   [] Vasopump (39693) [] Ionto (97152)   [] Other:    GOALS:  Patient stated goal: Feeling good support in my knee   [] Progressing: [] Met: [] Not Met: [] Adjusted    Therapist goals for Patient:   Short Term Goals: To be achieved in: 2 weeks  1. Independent in HEP and progression per patient tolerance, in order to prevent re-injury. [] Progressing: [] Met: [] Not Met: [] Adjusted  2.  Patient will have a decrease in pain to facilitate improvement in movement, function, and ADLs as indicated by Functional Deficits. [] Progressing: [] Met: [] Not Met: [] Adjusted    Long Term Goals: To be achieved in: 6 weeks  1. Increase FOTO functional outcome score from 40 to 64 to assist with reaching prior level of function. [] Progressing: [] Met: [] Not Met: [] Adjusted  2. Patient will demonstrate increased AROM  to allow for proper joint functioning as indicated by patients Functional Deficits. [] Progressing: [] Met: [] Not Met: [] Adjusted  3. Patient will demonstrate an increase in Strength to demonstrate good gait pattern for proper functional mobility as indicated by patients Functional Deficits. [] Progressing: [] Met: [] Not Met: [] Adjusted  4. Patient will return to usual functional activities without increased symptoms or restriction. [] Progressing: [] Met: [] Not Met: [] Adjusted       ASSESSMENT:    12-28-22 tolerated above ex w/o increased complaint. 12/30 -  Notes SLR made him sore - will all weight slowly for those - increased ex.  1-4-23 tolerated above ok  1-9-23 gradually progressing           Treatment/Activity Tolerance:  [x] Patient tolerated treatment well [] Patient limited by fatique  [] Patient limited by pain  [] Patient limited by other medical complications  [] Other:     Overall Progression Towards Functional goals/ Treatment Progress Update:  [] Patient is progressing as expected towards functional goals listed. [] Progression is slowed due to complexities/Impairments listed. [] Progression has been slowed due to co-morbidities.   [x] Plan just implemented, too soon to assess goals progression <30days   [] Goals require adjustment due to lack of progress  [] Patient is not progressing as expected and requires additional follow up with physician  [] Other    Prognosis for POC: [x] Good [] Fair  [] Poor    Patient requires continued skilled intervention: [x] Yes  [] No        PLAN:   [] Continue per plan of care [] Alter current plan (see comments)  [x] Plan of care initiated [] Hold pending MD visit [] Discharge    Electronically signed by: Qing Carey [id number]    Note: If patient does not return for scheduled/recommended follow up visits, this note will serve as a discharge from care along with the most recent update on progress.

## 2023-01-12 NOTE — FLOWSHEET NOTE
[facility] - Outpatient Rehabilitation and Therapy, [facility]  [address] [place] [facility], [facility]  Phone: [phone]   Fax:     [phone]      Physical Therapy Treatment Note/ Progress Report:     Date:  2023    Patient Name:  Nat Galeazzi    :  1970  MRN: [id number]    Pertinent Medical History:Additional Pertinent Hx: Arthritis, Morbid Obesity, Depression    Medical/Treatment Diagnosis Information:  Medical Diagnosis: Acute medial meniscus tear of right knee, initial encounter [S83.241A]  Treatment Diagnosis: Decreased Knee strength / ROM    Insurance/Certification information:  PT Insurance Information: UMR  Physician Information:  Gema Claros MD  Plan of care signed (Y/N): Inbox    Date of Patient follow up with Physician:  no apt made     Progress Report: []  Yes  [x]  No     Date Range for reporting period:  Beginnin2023  Ending:      Progress report due (10 Rx/or 30 days whichever is less):      Recertification due (POC duration/ or 90 days whichever is less):      Visit # POC/Insurance Allowable Auth Needed    60  (80/20) []Yes   [x]No     Latex Allergy:  [x]NO      []YES  Preferred Language for Healthcare:   [x]English       []Other:    Functional Scale:       Date assessed: at eval  Test:FOTO  Score:    Pain level:   2  /10     History of Injury:    Knee pain since a fall ~ 8 yrs ago    SUBJECTIVE:  C/o R knee pain and feeling like his knee wants to [employer] on him  22  reports still swollen, give out,  locking.  -  about the same in terms of locking and giving out. A bit less pain due to light duty  23 overall doing better, especially in mornings. Pain increases as day progresses. 30 min late due to traffic/ roads closed. 23 reports needs to make certain movements to be able to straighten it. Reports pain/ buckling less.    23 - Pain is mostly in R lower leg            OBJECTIVE:     Height 6'  347#    MRI 2019   CONCLUSION:   1. Complex tear with radial and horizontal components involving the posterior horn and midbody    of the lateral meniscus with the horizontal tear extending into the anterior horn. 2. 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 3. Patella connie with patellar chondromalacia. 4. Small knee joint effusion with synovial thickening involving the suprapatellar recess. 5. Superficial venous varicosities are observed, particularly lateral to the knee joint. RESTRICTIONS/PRECAUTIONS:     Exercises/Interventions:     Therapeutic Ex (11452)   Min:  40 Reps/Resistance Notes/CUES   Nu-step L-1 x 5 min          GSS B/HSS R 30 sec x 2 ea     HR/TR  Fitter    1 thin  X 15 ea B  X 10 F/B    ROM as evan         FAQ 22# - 2x10   R End ROM ext  improving   HS Curl   22# - 2x10    R Increase reps as evan   Leg press seat  8  60 #  2x10    B  Inc wt if evan         SLR 1# - 2x10   R    SAQ 4# - 2x10   R    Bridge  Clamshell           2 x 10  2 x 10 R    Therapeutic Activity (70382) Min:   5     Step ups 6\" x 10 R          Work on improved \"Push-off: during gait          Gum Drops B balance 30 sec     Rocker Board F/B, S/S 30 sec  Touch as needed   SLB 20 sec x 1 R Touch as needed    weight limit on bosu  300#              NMR re-education (47401)  Min:  CUES NEEDED             Manual Intervention (65964) Min:                               Modalities  Min:                      Other Therapeutic Activities: Pt was educated on PT POC, Diagnosis, Prognosis, pathomechanics as well as frequency and duration of scheduling future physical therapy appointments. Time was also taken on this day to answer all patient questions and participation in PT. Reviewed appointment policy in detail with patient and patient verbalized understanding. Home Exercise Program: Patient was instructed in the following for HEP:     . Patient verbalized/demonstrated understanding and was issued written handout. Access Code: [email or web address]  URL: [email or web address]  Date: 12/28/2022  Prepared by: Eli Rios    Exercises  Seated Long Arc Quad - 2 x daily - 7 x weekly - 1-2 sets - 10 reps  Short Arc Quad with Ankle Weight - 2 x daily - 7 x weekly - 2 sets - 10 reps - 4# hold  Active Straight Leg Raise with Quad Set - 2 x daily - 7 x weekly - 2 sets - 10 reps - 2# hold  Standing Heel Raise with Support - 2 x daily - 7 x weekly - 1 sets - 10 reps  Standing Ankle Dorsiflexion with Table Support - 2 x daily - 7 x weekly - 1 sets - 10 reps  Gastroc Stretch on Wall - 2 x daily - 7 x weekly - 2 reps - 30 sec hold  Standing Hamstring Stretch with Step - 2 x daily - 7 x weekly - 2 reps - 30 sec hold        Therapeutic Exercise and NMR EXR  [] (02018) Provided verbal/tactile cueing for activities related to strengthening, flexibility, endurance, ROM for improvements in LE, proximal hip, and core control with self care, mobility, lifting, ambulation.  [] (19714) Provided verbal/tactile cueing for activities related to improving balance, coordination, kinesthetic sense, posture, motor skill, proprioception  to assist with LE, proximal hip, and core control in self care, mobility, lifting, ambulation and eccentric single leg control.  [facility] The[facility] Activities:    [] (50942 or 18553) Provided verbal/tactile cueing for activities related to improving balance, coordination, kinesthetic sense, posture, motor skill, proprioception and motor activation to allow for proper function of core, proximal hip and LE with self care and ADLs and functional mobility.   [] (19910) Gait Re-education- Provided training and instruction to the patient for proper LE, core and proximal hip recruitment and positioning and eccentric body weight control with ambulation re-education including up and down stairs     Home Exercise Program:    [x] (41195) Reviewed/Progressed HEP activities related to strengthening, flexibility, endurance, ROM of core, proximal hip and LE for functional self-care, mobility, lifting and ambulation/stair navigation   [] (79985)Reviewed/Progressed HEP activities related to improving balance, coordination, kinesthetic sense, posture, motor skill, proprioception of core, proximal hip and LE for self care, mobility, lifting, and ambulation/stair navigation      Manual Treatments:  PROM / STM / Oscillations-Mobs:  G-I, II, III, IV (PA's, Inf., Post.)  [] (18963) Provided manual therapy to mobilize LE, proximal hip and/or LS spine soft tissue/joints for the purpose of modulating pain, promoting relaxation,  increasing ROM, reducing/eliminating soft tissue swelling/inflammation/restriction, improving soft tissue extensibility and allowing for proper ROM for normal function with self care, mobility, lifting and ambulation. Charges:  Timed Code Treatment Minutes: 40   Total Treatment Minutes: 40      [] EVAL (LOW) 39570 (typically 20 minutes face-to-face)  [] EVAL (MOD) 90967 (typically 30 minutes face-to-face)  [] EVAL (HIGH) 31965 (typically 45 minutes face-to-face)  [] RE-EVAL     [x] ID([id number]) x   2  [] Dry needle 1 or 2 Muscles (70685)  [] NMR (41466) x     [] Dry needle 3+ Muscles (02810)  [] Manual (24969) x     [] Ultrasound (22567) x  [x] TA (16484) x     [] Mech Traction (36159)  [] ES(attended) (95109)     [] ES (un) (80148):   [] Vasopump (50422) [] Ionto (39872)   [] Other:    GOALS:  Patient stated goal: Feeling good support in my knee   [] Progressing: [] Met: [] Not Met: [] Adjusted    Therapist goals for Patient:   Short Term Goals: To be achieved in: 2 weeks  1. Independent in HEP and progression per patient tolerance, in order to prevent re-injury. [] Progressing: [] Met: [] Not Met: [] Adjusted  2.  Patient will have a decrease in pain to facilitate improvement in movement, function, and ADLs as indicated by Functional Deficits. [] Progressing: [] Met: [] Not Met: [] Adjusted    Long Term Goals: To be achieved in: 6 weeks  1. Increase FOTO functional outcome score from 40 to 64 to assist with reaching prior level of function. [] Progressing: [] Met: [] Not Met: [] Adjusted  2. Patient will demonstrate increased AROM  to allow for proper joint functioning as indicated by patients Functional Deficits. [] Progressing: [] Met: [] Not Met: [] Adjusted  3. Patient will demonstrate an increase in Strength to demonstrate good gait pattern for proper functional mobility as indicated by patients Functional Deficits. [] Progressing: [] Met: [] Not Met: [] Adjusted  4. Patient will return to usual functional activities without increased symptoms or restriction. [] Progressing: [] Met: [] Not Met: [] Adjusted       ASSESSMENT:    12-28-22 tolerated above ex w/o increased complaint. 12/30 -  Notes SLR made him sore - will all weight slowly for those - increased ex.  1-4-23 tolerated above ok  1-9-23 gradually progressing   1/12/23 - States lower leg feels best with gastroc stretches          Treatment/Activity Tolerance:  [x] Patient tolerated treatment well [] Patient limited by fatique  [] Patient limited by pain  [] Patient limited by other medical complications  [] Other:     Overall Progression Towards Functional goals/ Treatment Progress Update:  [] Patient is progressing as expected towards functional goals listed. [] Progression is slowed due to complexities/Impairments listed. [] Progression has been slowed due to co-morbidities.   [x] Plan just implemented, too soon to assess goals progression <30days   [] Goals require adjustment due to lack of progress  [] Patient is not progressing as expected and requires additional follow up with physician  [] Other    Prognosis for POC: [x] Good [] Fair  [] Poor    Patient requires continued skilled intervention: [x] Yes  [] No        PLAN:   [] Continue per plan of care [] Alter current plan (see comments)  [x] Plan of care initiated [] Hold pending MD visit [] Discharge    Electronically signed by: Tong JENSEN#[id number]    Note: If patient does not return for scheduled/recommended follow up visits, this note will serve as a discharge from care along with the most recent update on progress.

## 2023-01-16 ENCOUNTER — HOSPITAL ENCOUNTER (OUTPATIENT)
Dept: PHYSICAL THERAPY | Age: 53
Setting detail: THERAPIES SERIES
Discharge: HOME | End: 2023-01-16
Payer: COMMERCIAL

## 2023-01-16 PROCEDURE — 97110 THERAPEUTIC EXERCISES: CPT

## 2023-01-16 PROCEDURE — 97530 THERAPEUTIC ACTIVITIES: CPT

## 2023-01-16 NOTE — FLOWSHEET NOTE
[facility] - Outpatient Rehabilitation and Therapy, [facility]  [address] [facility], [facility]  Phone: [phone]   Fax:     [phone]      Physical Therapy Treatment Note/ Progress Report:     Date:  2023    Patient Name:  Sukhdev Reynolds    :  1970  MRN: [id number]    Pertinent Medical History:Additional Pertinent Hx: Arthritis, Morbid Obesity, Depression    Medical/Treatment Diagnosis Information:  Medical Diagnosis: Acute medial meniscus tear of right knee, initial encounter [S83.241A]  Treatment Diagnosis: Decreased Knee strength / ROM    Insurance/Certification information:  PT Insurance Information: UMR  Physician Information:  Quin Kawasaki, MD  Plan of care signed (Y/N): Inbox    Date of Patient follow up with Physician:  no apt made     Progress Report: []  Yes  [x]  No     Date Range for reporting period:  Beginnin2023  Ending:      Progress report due (10 Rx/or 30 days whichever is less):      Recertification due (POC duration/ or 90 days whichever is less):      Visit # POC/Insurance Allowable Auth Needed    60  (80/20) []Yes   [x]No     Latex Allergy:  [x]NO      []YES  Preferred Language for Healthcare:   [x]English       []Other:    Functional Scale:       Date assessed: at eval  Test:FOTO  Score:    Pain level:   2  /10     History of Injury:    Knee pain since a fall ~ 8 yrs ago    SUBJECTIVE:  C/o R knee pain and feeling like his knee wants to [employer] on him  22  reports still swollen, give out,  locking.  -  about the same in terms of locking and giving out. A bit less pain due to light duty  23 overall doing better, especially in mornings. Pain increases as day progresses. 30 min late due to traffic/ roads closed. 23 reports needs to make certain movements to be able to straighten it. Reports pain/ buckling less.    23 - Pain is mostly in R lower leg   23 reports increased edema and decreased ROM. Does not feel he did anything to aggravate it. Pain the same. F/U  MD 1-25-23. OBJECTIVE:     Height 6'  347#    MRI 2019   CONCLUSION:   1. Complex tear with radial and horizontal components involving the posterior horn and midbody    of the lateral meniscus with the horizontal tear extending into the anterior horn. 2. 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 3. Patella connie with patellar chondromalacia. 4. Small knee joint effusion with synovial thickening involving the suprapatellar recess. 5. Superficial venous varicosities are observed, particularly lateral to the knee joint. RESTRICTIONS/PRECAUTIONS:     Exercises/Interventions:     Therapeutic Ex (83454)   Min:  40 Reps/Resistance Notes/CUES   Nu-step L-1 x 5 min          GSS B/ HSS R 30 sec x 2 ea     HR/TR  Fitter    1 thin  X 15 ea B  X 10 F/B    R     ROM as evan         FAQ    15 #- 2x10   R Decreased wt due to agg   HS Curl   22# - 2x10    R Increase reps as evan   Leg press seat  8  70 #  2x10    B          SLR 1# - 2x10   R    SAQ 4# - 2x10   R    Bridge  Clamshell           2 x 10  2 x 10 R    Therapeutic Activity (72530) Min:   5     Step ups 6\" x 10 R                    Gum Drops B balance 30 sec     Rocker Board F/B, S/S 30 sec  Touch as needed   SLB 30 sec x 1 R Touch as needed    weight limit on bosu  300#              NMR re-education (93840)  Min:  CUES NEEDED             Manual Intervention (14727) Min:                               Modalities  Min:                      Other Therapeutic Activities: Pt was educated on PT POC, Diagnosis, Prognosis, pathomechanics as well as frequency and duration of scheduling future physical therapy appointments. Time was also taken on this day to answer all patient questions and participation in PT.  Reviewed appointment policy in detail with patient and patient verbalized understanding. Home Exercise Program: Patient was instructed in the following for HEP:     . Patient verbalized/demonstrated understanding and was issued written handout. Access Code: [email or web address]  URL: [email or web address]  Date: 12/28/2022  Prepared by: Espinoza Calvillo    Exercises  Seated Long Arc Quad - 2 x daily - 7 x weekly - 1-2 sets - 10 reps  Short Arc Quad with Ankle Weight - 2 x daily - 7 x weekly - 2 sets - 10 reps - 4# hold  Active Straight Leg Raise with Quad Set - 2 x daily - 7 x weekly - 2 sets - 10 reps - 2# hold  Standing Heel Raise with Support - 2 x daily - 7 x weekly - 1 sets - 10 reps  Standing Ankle Dorsiflexion with Table Support - 2 x daily - 7 x weekly - 1 sets - 10 reps  Gastroc Stretch on Wall - 2 x daily - 7 x weekly - 2 reps - 30 sec hold  Standing Hamstring Stretch with Step - 2 x daily - 7 x weekly - 2 reps - 30 sec hold        Therapeutic Exercise and NMR EXR  [] (82754) Provided verbal/tactile cueing for activities related to strengthening, flexibility, endurance, ROM for improvements in LE, proximal hip, and core control with self care, mobility, lifting, ambulation.  [] (10093) Provided verbal/tactile cueing for activities related to improving balance, coordination, kinesthetic sense, posture, motor skill, proprioception  to assist with LE, proximal hip, and core control in self care, mobility, lifting, ambulation and eccentric single leg control.  [address] and Therapeutic Activities:    [] (51248 or 85036) Provided verbal/tactile cueing for activities related to improving balance, coordination, kinesthetic sense, posture, motor skill, proprioception and motor activation to allow for proper function of core, proximal hip and LE with self care and ADLs and functional mobility.   [] (85914) Gait Re-education- Provided training and instruction to the patient for proper LE, core and proximal hip recruitment and positioning and eccentric body weight control with ambulation re-education including up and down stairs     Home Exercise Program:    [x] (41364) Reviewed/Progressed HEP activities related to strengthening, flexibility, endurance, ROM of core, proximal hip and LE for functional self-care, mobility, lifting and ambulation/stair navigation   [] (75760)Reviewed/Progressed HEP activities related to improving balance, coordination, kinesthetic sense, posture, motor skill, proprioception of core, proximal hip and LE for self care, mobility, lifting, and ambulation/stair navigation      Manual Treatments:  PROM / STM / Oscillations-Mobs:  G-I, II, III, IV (PA's, Inf., Post.)  [] (12713) Provided manual therapy to mobilize LE, proximal hip and/or LS spine soft tissue/joints for the purpose of modulating pain, promoting relaxation,  increasing ROM, reducing/eliminating soft tissue swelling/inflammation/restriction, improving soft tissue extensibility and allowing for proper ROM for normal function with self care, mobility, lifting and ambulation. Charges:  Timed Code Treatment Minutes: 40   Total Treatment Minutes: 40      [] EVAL (LOW) 79633 (typically 20 minutes face-to-face)  [] EVAL (MOD) 41907 (typically 30 minutes face-to-face)  [] EVAL (HIGH) 76610 (typically 45 minutes face-to-face)  [] RE-EVAL     [x] QX(90169) x   2  [] Dry needle 1 or 2 Muscles (24257)  [] NMR (18837) x     [] Dry needle 3+ Muscles (44881)  [] Manual (88590) x     [] Ultrasound (30243) x  [x] TA (35338) x     [] Mech Traction (57772)  [] ES(attended) (83705)     [] ES (un) (66262):   [] Vasopump (28984) [] Ionto (54508)   [] Other:    GOALS:  Patient stated goal: Feeling good support in my knee   [] Progressing: [] Met: [] Not Met: [] Adjusted    Therapist goals for Patient:   Short Term Goals: To be achieved in: 2 weeks  1. Independent in HEP and progression per patient tolerance, in order to prevent re-injury.    [] Progressing: [] Met: [] Not Met: [] Adjusted  2. Patient will have a decrease in pain to facilitate improvement in movement, function, and ADLs as indicated by Functional Deficits. [] Progressing: [] Met: [] Not Met: [] Adjusted    Long Term Goals: To be achieved in: 6 weeks  1. Increase FOTO functional outcome score from 40 to 64 to assist with reaching prior level of function. [] Progressing: [] Met: [] Not Met: [] Adjusted  2. Patient will demonstrate increased AROM  to allow for proper joint functioning as indicated by patients Functional Deficits. [] Progressing: [] Met: [] Not Met: [] Adjusted  3. Patient will demonstrate an increase in Strength to demonstrate good gait pattern for proper functional mobility as indicated by patients Functional Deficits. [] Progressing: [] Met: [] Not Met: [] Adjusted  4. Patient will return to usual functional activities without increased symptoms or restriction. [] Progressing: [] Met: [] Not Met: [] Adjusted       ASSESSMENT:    12-28-22 tolerated above ex w/o increased complaint. 12/30 -  Notes SLR made him sore - will all weight slowly for those - increased ex.  1-4-23 tolerated above ok  1-9-23 gradually progressing   1/12/23 - States lower leg feels best with gastroc stretches  1-16-23 tolerated above ex, need to decrease FAQ wt due to discomfort. Treatment/Activity Tolerance:  [x] Patient tolerated treatment well [] Patient limited by fatique  [] Patient limited by pain  [] Patient limited by other medical complications  [] Other:     Overall Progression Towards Functional goals/ Treatment Progress Update:  [] Patient is progressing as expected towards functional goals listed. [] Progression is slowed due to complexities/Impairments listed. [] Progression has been slowed due to co-morbidities.   [x] Plan just implemented, too soon to assess goals progression <30days   [] Goals require adjustment due to lack of progress  [] Patient is not progressing as expected and requires additional follow up with physician  [] Other    Prognosis for POC: [x] Good [] Fair  [] Poor    Patient requires continued skilled intervention: [x] Yes  [] No        PLAN:   [] Continue per plan of care [] Alter current plan (see comments)  [x] Plan of care initiated [] Hold pending MD visit [] Discharge    Electronically signed by: Sandy Monae,     Note: If patient does not return for scheduled/recommended follow up visits, this note will serve as a discharge from care along with the most recent update on progress.

## 2023-01-19 ENCOUNTER — HOSPITAL ENCOUNTER (OUTPATIENT)
Dept: PHYSICAL THERAPY | Age: 53
Setting detail: THERAPIES SERIES
Discharge: HOME | End: 2023-01-19
Payer: COMMERCIAL

## 2023-01-19 PROCEDURE — 97016 VASOPNEUMATIC DEVICE THERAPY: CPT | Performed by: CHIROPRACTOR

## 2023-01-19 PROCEDURE — 97110 THERAPEUTIC EXERCISES: CPT | Performed by: CHIROPRACTOR

## 2023-01-19 NOTE — FLOWSHEET NOTE
[facility] - Outpatient Rehabilitation and Therapy, [facility]  [address] [facility], [facility]  Phone: [phone]   Fax:     [phone]      Physical Therapy Treatment Note/ Progress Report:     Date:  2023    Patient Name:  Coby Alfonso    :  1970  MRN: [id number]    Pertinent Medical History:Additional Pertinent Hx: Arthritis, Morbid Obesity, Depression    Medical/Treatment Diagnosis Information:  Medical Diagnosis: Acute medial meniscus tear of right knee, initial encounter [S83.241A]  Treatment Diagnosis: Decreased Knee strength / ROM    Insurance/Certification information:  PT Insurance Information: UMR  Physician Information:  Tiara Shanks MD  Plan of care signed (Y/N): Inbox    Date of Patient follow up with Physician:  no apt made     Progress Report: []  Yes  [x]  No     Date Range for reporting period:  Beginnin2023  Ending:      Progress report due (10 Rx/or 30 days whichever is less):      Recertification due (POC duration/ or 90 days whichever is less):      Visit # POC/Insurance Allowable Auth Needed    60  (80/20) []Yes   [x]No     Latex Allergy:  [x]NO      []YES  Preferred Language for Healthcare:   [x]English       []Other:    Functional Scale:       Date assessed: at eval  Test:FOTO  Score:    Pain level:   2  /10     History of Injury:    Knee pain since a fall ~ 8 yrs ago    SUBJECTIVE:  C/o R knee pain and feeling like his knee wants to [employer] on him  22  reports still swollen, give out,  locking.  -  about the same in terms of locking and giving out. A bit less pain due to light duty  23 overall doing better, especially in mornings. Pain increases as day progresses. 30 min late due to traffic/ roads closed. 23 reports needs to make certain movements to be able to straighten it. Reports pain/ buckling less.    23 - Pain is mostly in R lower leg   23 reports increased edema and decreased ROM. Does not feel he did anything to aggravate it. Pain the same. F/U  MD 1-25-23. OBJECTIVE:     Height 6'  347#    MRI 2019   CONCLUSION:   1. Complex tear with radial and horizontal components involving the posterior horn and midbody    of the lateral meniscus with the horizontal tear extending into the anterior horn. 2. 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 3. Patella connie with patellar chondromalacia. 4. Small knee joint effusion with synovial thickening involving the suprapatellar recess. 5. Superficial venous varicosities are observed, particularly lateral to the knee joint. RESTRICTIONS/PRECAUTIONS:     Exercises/Interventions:     Therapeutic Ex (02818)   Min:  40 Reps/Resistance Notes/CUES   Nu-step           #10 L-1 x 5 min          GSS B/ HSS B 30 sec x 2 ea     HR/TR  Fitter    1 thin  X 15 ea B   R     ROM as evan         FAQ   22 #- 2x10   B Manual medial patella glide helped relieve medial discomfort   HS Curl   22# - 2x10    R    Leg press seat  8  70 #  2x10    B          SLR 1# - 2x10   R    SAQ 4# - 2x10   R    Bridge  Clamshell           2 x 10  2 x 10 R    Therapeutic Activity (01960) Min:   5     Step ups 6\" x 10 R                    Gum Drops B balance    Rocker Board F/B, S/S 30 sec  Touch as needed   SLB Touch as needed    weight limit on bosu  300#              NMR re-education (38261)  Min:  CUES NEEDED             Manual Intervention (26536) Min:                               Modalities  Min:     Game Ready  x15                Other Therapeutic Activities: Pt was educated on PT POC, Diagnosis, Prognosis, pathomechanics as well as frequency and duration of scheduling future physical therapy appointments.  Time was also taken on this day to answer all patient questions and participation in PT. Reviewed appointment policy in detail with patient and patient verbalized understanding. Home Exercise Program: Patient was instructed in the following for HEP:     . Patient verbalized/demonstrated understanding and was issued written handout. Access Code: [email or web address]  URL: [email or web address]  Date: 12/28/2022  Prepared by: Martin Dugan    Exercises  Seated Long Arc Quad - 2 x daily - 7 x weekly - 1-2 sets - 10 reps  Short Arc Quad with Ankle Weight - 2 x daily - 7 x weekly - 2 sets - 10 reps - 4# hold  Active Straight Leg Raise with Quad Set - 2 x daily - 7 x weekly - 2 sets - 10 reps - 2# hold  Standing Heel Raise with Support - 2 x daily - 7 x weekly - 1 sets - 10 reps  Standing Ankle Dorsiflexion with Table Support - 2 x daily - 7 x weekly - 1 sets - 10 reps  Gastroc Stretch on Wall - 2 x daily - 7 x weekly - 2 reps - 30 sec hold  Standing Hamstring Stretch with Step - 2 x daily - 7 x weekly - 2 reps - 30 sec hold        Therapeutic Exercise and NMR EXR  [] (93658) Provided verbal/tactile cueing for activities related to strengthening, flexibility, endurance, ROM for improvements in LE, proximal hip, and core control with self care, mobility, lifting, ambulation.  [] (46178) Provided verbal/tactile cueing for activities related to improving balance, coordination, kinesthetic sense, posture, motor skill, proprioception  to assist with LE, proximal hip, and core control in self care, mobility, lifting, ambulation and eccentric single leg control.  [address] and Therapeutic Activities:    [] (79099 or 35504) Provided verbal/tactile cueing for activities related to improving balance, coordination, kinesthetic sense, posture, motor skill, proprioception and motor activation to allow for proper function of core, proximal hip and LE with self care and ADLs and functional mobility.   [] (30575) Gait Re-education- Provided training and instruction to the patient for proper LE, core and proximal hip recruitment and positioning and eccentric body weight control with ambulation re-education including up and down stairs     Home Exercise Program:    [x] (18219) Reviewed/Progressed HEP activities related to strengthening, flexibility, endurance, ROM of core, proximal hip and LE for functional self-care, mobility, lifting and ambulation/stair navigation   [] (20473)Reviewed/Progressed HEP activities related to improving balance, coordination, kinesthetic sense, posture, motor skill, proprioception of core, proximal hip and LE for self care, mobility, lifting, and ambulation/stair navigation      Manual Treatments:  PROM / STM / Oscillations-Mobs:  G-I, II, III, IV (PA's, Inf., Post.)  [] (79905) Provided manual therapy to mobilize LE, proximal hip and/or LS spine soft tissue/joints for the purpose of modulating pain, promoting relaxation,  increasing ROM, reducing/eliminating soft tissue swelling/inflammation/restriction, improving soft tissue extensibility and allowing for proper ROM for normal function with self care, mobility, lifting and ambulation. Charges:  Timed Code Treatment Minutes: 30   Total Treatment Minutes: 45      [] EVAL (LOW) 76270 (typically 20 minutes face-to-face)  [] EVAL (MOD) 75613 (typically 30 minutes face-to-face)  [] EVAL (HIGH) 03585 (typically 45 minutes face-to-face)  [] RE-EVAL     [x] ST(76358) x   2  [] Dry needle 1 or 2 Muscles (70778)  [] NMR (40997) x     [] Dry needle 3+ Muscles (11294)  [] Manual (78241) x     [] Ultrasound (05583) x  [] TA (73417) x     [] Mech Traction (72503)  [] ES(attended) (87134)     [] ES (un) (50950):   [x] Vasopump (15115) [] Ionto (62958)   [] Other:    GOALS:  Patient stated goal: Feeling good support in my knee   [] Progressing: [] Met: [] Not Met: [] Adjusted    Therapist goals for Patient:   Short Term Goals: To be achieved in: 2 weeks  1. Independent in HEP and progression per patient tolerance, in order to prevent re-injury.    [] Progressing: [] Met: [] Not Met: [] Adjusted  2. Patient will have a decrease in pain to facilitate improvement in movement, function, and ADLs as indicated by Functional Deficits. [] Progressing: [] Met: [] Not Met: [] Adjusted    Long Term Goals: To be achieved in: 6 weeks  1. Increase FOTO functional outcome score from 40 to 64 to assist with reaching prior level of function. [] Progressing: [] Met: [] Not Met: [] Adjusted  2. Patient will demonstrate increased AROM  to allow for proper joint functioning as indicated by patients Functional Deficits. [] Progressing: [] Met: [] Not Met: [] Adjusted  3. Patient will demonstrate an increase in Strength to demonstrate good gait pattern for proper functional mobility as indicated by patients Functional Deficits. [] Progressing: [] Met: [] Not Met: [] Adjusted  4. Patient will return to usual functional activities without increased symptoms or restriction. [] Progressing: [] Met: [] Not Met: [] Adjusted       ASSESSMENT:    12-28-22 tolerated above ex w/o increased complaint. 12/30 -  Notes SLR made him sore - will all weight slowly for those - increased ex.  1-4-23 tolerated above ok  1-9-23 gradually progressing   1/12/23 - States lower leg feels best with gastroc stretches  1-16-23 tolerated above ex, need to decrease FAQ wt due to discomfort. Treatment/Activity Tolerance:  [x] Patient tolerated treatment well [] Patient limited by fatique  [] Patient limited by pain  [] Patient limited by other medical complications  [] Other:     Overall Progression Towards Functional goals/ Treatment Progress Update:  [] Patient is progressing as expected towards functional goals listed. [] Progression is slowed due to complexities/Impairments listed. [] Progression has been slowed due to co-morbidities.   [x] Plan just implemented, too soon to assess goals progression <30days   [] Goals require adjustment due to lack of progress  [] Patient is not progressing as expected and requires additional follow up with physician  [] Other    Prognosis for POC: [x] Good [] Fair  [] Poor    Patient requires continued skilled intervention: [x] Yes  [] No        PLAN:   [] Continue per plan of care [] Alter current plan (see comments)  [x] Plan of care initiated [] Hold pending MD visit [] Discharge    Electronically signed by: Lesly SOTO#[id number]    Note: If patient does not return for scheduled/recommended follow up visits, this note will serve as a discharge from care along with the most recent update on progress.

## 2023-01-20 DIAGNOSIS — S83.241A ACUTE MEDIAL MENISCUS TEAR OF RIGHT KNEE, INITIAL ENCOUNTER: ICD-10-CM

## 2023-01-23 ENCOUNTER — HOSPITAL ENCOUNTER (OUTPATIENT)
Dept: PHYSICAL THERAPY | Age: 53
Setting detail: THERAPIES SERIES
Discharge: HOME | End: 2023-01-23
Payer: COMMERCIAL

## 2023-01-23 PROCEDURE — 97016 VASOPNEUMATIC DEVICE THERAPY: CPT | Performed by: CHIROPRACTOR

## 2023-01-23 PROCEDURE — 97110 THERAPEUTIC EXERCISES: CPT | Performed by: CHIROPRACTOR

## 2023-01-23 RX ORDER — NAPROXEN 500 MG/1
TABLET ORAL
Qty: 60 TABLET | Refills: 0 | Status: SHIPPED | OUTPATIENT
Start: 2023-01-23

## 2023-01-23 NOTE — FLOWSHEET NOTE
[facility] - Outpatient Rehabilitation and Therapy, [facility]  [address] [facility], [facility]  Phone: [phone]   Fax:     [phone]      Physical Therapy Treatment Note/ Progress Report:     Date:  2023    Patient Name:  Jenny James    :  1970  MRN: [id number]    Pertinent Medical History:Additional Pertinent Hx: Arthritis, Morbid Obesity, Depression    Medical/Treatment Diagnosis Information:  Medical Diagnosis: Acute medial meniscus tear of right knee, initial encounter [S83.241A]  Treatment Diagnosis: Decreased Knee strength / ROM    Insurance/Certification information:  PT Insurance Information: UMR  Physician Information:  Petrona Hassan MD  Plan of care signed (Y/N): Inbox    Date of Patient follow up with Physician:  no apt made     Progress Report: []  Yes  [x]  No     Date Range for reporting period:  Beginnin2023  Ending:      Progress report due (10 Rx/or 30 days whichever is less):      Recertification due (POC duration/ or 90 days whichever is less):      Visit # POC/Insurance Allowable Auth Needed    60  (80/20) []Yes   [x]No     Latex Allergy:  [x]NO      []YES  Preferred Language for Healthcare:   [x]English       []Other:    Functional Scale:       Date assessed: at eval  Test:FOTO  Score:    Pain level:   0-1 /10     History of Injury:    Knee pain since a fall ~ 8 yrs ago    SUBJECTIVE:  C/o R knee pain and feeling like his knee wants to [employer] on him  22  reports still swollen, give out,  locking.  -  about the same in terms of locking and giving out. A bit less pain due to light duty  23 overall doing better, especially in mornings. Pain increases as day progresses. 30 min late due to traffic/ roads closed. 23 reports needs to make certain movements to be able to straighten it. Reports pain/ buckling less.    23 - Pain is mostly in R lower leg   23 reports increased edema and decreased ROM. Does not feel he did anything to aggravate it. Pain the same. F/U  MD 1-25-23.  1/23/23- Reports feeling better since last appt          OBJECTIVE:     Height 6'  347#    MRI 2019   CONCLUSION:   1. Complex tear with radial and horizontal components involving the posterior horn and midbody    of the lateral meniscus with the horizontal tear extending into the anterior horn. 2. 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 3. Patella connie with patellar chondromalacia. 4. Small knee joint effusion with synovial thickening involving the suprapatellar recess. 5. Superficial venous varicosities are observed, particularly lateral to the knee joint. RESTRICTIONS/PRECAUTIONS:     Exercises/Interventions:     Therapeutic Ex (86916)   Min:  40 Reps/Resistance Notes/CUES   Nu-step           #10 L-1 x 5 min          GSS B/ HSS B 30 sec x 2 ea     HR/TR  Fitter    1 thin  X 15 ea B   R     ROM as evan         FAQ   22 #- 2x10   B Manual medial patella glide helped relieve medial discomfort   HS Curl   22# - 2x10    R    Leg press seat  8  70 #  2x10    B          SLR 1# - 2x10   R    SAQ 4# - 2x10   R    Bridge  Clamshell           2 x 10  2 x 10 R    Therapeutic Activity (96609) Min:   5     Step ups 6\" x 10 R                    Gum Drops B balance    Rocker Board F/B, S/S 30 sec  Touch as needed   SLB Touch as needed    weight limit on bosu  300#              NMR re-education (48606)  Min:  CUES NEEDED             Manual Intervention (89984) Min:                               Modalities  Min:     Game Ready  x15                Other Therapeutic Activities: Pt was educated on PT POC, Diagnosis, Prognosis, pathomechanics as well as frequency and duration of scheduling future physical therapy appointments.  Time was also taken on this day to answer all patient questions and participation in PT. Reviewed appointment policy in detail with patient and patient verbalized understanding. Home Exercise Program: Patient was instructed in the following for HEP:     . Patient verbalized/demonstrated understanding and was issued written handout. Access Code: [email or web address]  URL: [email or web address]  Date: 12/28/2022  Prepared by: Edwin Saez    Exercises  Seated Long Arc Quad - 2 x daily - 7 x weekly - 1-2 sets - 10 reps  Short Arc Quad with Ankle Weight - 2 x daily - 7 x weekly - 2 sets - 10 reps - 4# hold  Active Straight Leg Raise with Quad Set - 2 x daily - 7 x weekly - 2 sets - 10 reps - 2# hold  Standing Heel Raise with Support - 2 x daily - 7 x weekly - 1 sets - 10 reps  Standing Ankle Dorsiflexion with Table Support - 2 x daily - 7 x weekly - 1 sets - 10 reps  Gastroc Stretch on Wall - 2 x daily - 7 x weekly - 2 reps - 30 sec hold  Standing Hamstring Stretch with Step - 2 x daily - 7 x weekly - 2 reps - 30 sec hold        Therapeutic Exercise and NMR EXR  [] (77349) Provided verbal/tactile cueing for activities related to strengthening, flexibility, endurance, ROM for improvements in LE, proximal hip, and core control with self care, mobility, lifting, ambulation.  [] (24495) Provided verbal/tactile cueing for activities related to improving balance, coordination, kinesthetic sense, posture, motor skill, proprioception  to assist with LE, proximal hip, and core control in self care, mobility, lifting, ambulation and eccentric single leg control.  [address] and Therapeutic Activities:    [] (12064 or 15853) Provided verbal/tactile cueing for activities related to improving balance, coordination, kinesthetic sense, posture, motor skill, proprioception and motor activation to allow for proper function of core, proximal hip and LE with self care and ADLs and functional mobility.   [] (93597) Gait Re-education- Provided training and instruction to the patient for proper LE, core and proximal hip recruitment and positioning and eccentric body weight control with ambulation re-education including up and down stairs     Home Exercise Program:    [x] (78761) Reviewed/Progressed HEP activities related to strengthening, flexibility, endurance, ROM of core, proximal hip and LE for functional self-care, mobility, lifting and ambulation/stair navigation   [] (40501)Reviewed/Progressed HEP activities related to improving balance, coordination, kinesthetic sense, posture, motor skill, proprioception of core, proximal hip and LE for self care, mobility, lifting, and ambulation/stair navigation      Manual Treatments:  PROM / STM / Oscillations-Mobs:  G-I, II, III, IV (PA's, Inf., Post.)  [] (35090) Provided manual therapy to mobilize LE, proximal hip and/or LS spine soft tissue/joints for the purpose of modulating pain, promoting relaxation,  increasing ROM, reducing/eliminating soft tissue swelling/inflammation/restriction, improving soft tissue extensibility and allowing for proper ROM for normal function with self care, mobility, lifting and ambulation. Charges:  Timed Code Treatment Minutes: 30   Total Treatment Minutes: 45      [] EVAL (LOW) 54821 (typically 20 minutes face-to-face)  [] EVAL (MOD) 85825 (typically 30 minutes face-to-face)  [] EVAL (HIGH) 83896 (typically 45 minutes face-to-face)  [] RE-EVAL     [x] YW(40543) x   2  [] Dry needle 1 or 2 Muscles (26145)  [] NMR (22077) x     [] Dry needle 3+ Muscles (76252)  [] Manual (27238) x     [] Ultrasound (94000) x  [] TA (53204) x     [] Mech Traction (26021)  [] ES(attended) (69190)     [] ES (un) (24652):   [x] Vasopump (67711) [] Ionto (53649)   [] Other:    GOALS:  Patient stated goal: Feeling good support in my knee   [] Progressing: [] Met: [] Not Met: [] Adjusted    Therapist goals for Patient:   Short Term Goals: To be achieved in: 2 weeks  1.  Independent in HEP and progression per patient tolerance, in order to prevent re-injury. [] Progressing: [] Met: [] Not Met: [] Adjusted  2. Patient will have a decrease in pain to facilitate improvement in movement, function, and ADLs as indicated by Functional Deficits. [] Progressing: [] Met: [] Not Met: [] Adjusted    Long Term Goals: To be achieved in: 6 weeks  1. Increase FOTO functional outcome score from 40 to 64 to assist with reaching prior level of function. [] Progressing: [] Met: [] Not Met: [] Adjusted  2. Patient will demonstrate increased AROM  to allow for proper joint functioning as indicated by patients Functional Deficits. [] Progressing: [] Met: [] Not Met: [] Adjusted  3. Patient will demonstrate an increase in Strength to demonstrate good gait pattern for proper functional mobility as indicated by patients Functional Deficits. [] Progressing: [] Met: [] Not Met: [] Adjusted  4. Patient will return to usual functional activities without increased symptoms or restriction. [] Progressing: [] Met: [] Not Met: [] Adjusted       ASSESSMENT:    12-28-22 tolerated above ex w/o increased complaint. 12/30 -  Notes SLR made him sore - will all weight slowly for those - increased ex.  1-4-23 tolerated above ok  1-9-23 gradually progressing   1/12/23 - States lower leg feels best with gastroc stretches  1-16-23 tolerated above ex, need to decrease FAQ wt due to discomfort. Treatment/Activity Tolerance:  [x] Patient tolerated treatment well [] Patient limited by fatique  [] Patient limited by pain  [] Patient limited by other medical complications  [] Other:     Overall Progression Towards Functional goals/ Treatment Progress Update:  [] Patient is progressing as expected towards functional goals listed. [] Progression is slowed due to complexities/Impairments listed. [] Progression has been slowed due to co-morbidities.   [x] Plan just implemented, too soon to assess goals progression <30days   [] Goals require adjustment due to lack of progress  [] Patient is not progressing as expected and requires additional follow up with physician  [] Other    Prognosis for POC: [x] Good [] Fair  [] Poor    Patient requires continued skilled intervention: [x] Yes  [] No        PLAN:   [] Continue per plan of care [] Alter current plan (see comments)  [x] Plan of care initiated [] Hold pending MD visit [] Discharge    Electronically signed by: Lissette Marino  [id number]    Note: If patient does not return for scheduled/recommended follow up visits, this note will serve as a discharge from care along with the most recent update on progress.

## 2023-01-25 ENCOUNTER — OFFICE VISIT (OUTPATIENT)
Dept: ORTHOPEDIC SURGERY | Age: 53
End: 2023-01-25

## 2023-01-25 VITALS — HEIGHT: 72 IN | WEIGHT: 315 LBS | BODY MASS INDEX: 42.66 KG/M2

## 2023-01-25 DIAGNOSIS — S83.241A ACUTE MEDIAL MENISCUS TEAR OF RIGHT KNEE, INITIAL ENCOUNTER: ICD-10-CM

## 2023-01-25 DIAGNOSIS — M25.561 RIGHT KNEE PAIN, UNSPECIFIED CHRONICITY: Primary | ICD-10-CM

## 2023-01-25 NOTE — PROGRESS NOTES
CHIEF COMPLAINT: Right knee pain/ possible medial meniscus tear. HISTORY:  Mr. Aj Freeman 50 y.o.  male presents today for f/u and still in pain even with PT. He was seen on 12/14/2022 as a consultation request from CHELSEA Fields CNP for evaluation of right knee pain which started 8 ears ago when he fell backward and bent his leg. He is complaining of sharp pain, 2-7/10. Pain is increase with standing and walking and decrease with rest. Pain is sharp early in the morning with first few steps, dull achy pain by the end of the day. Alleviating factors: rest. No radiation and no numbness and tingling sensation. No other complaint. There is a h/o catching and locking. No h/o gout. Past Medical History:   Diagnosis Date    Arthritis        Past Surgical History:   Procedure Laterality Date    APPENDECTOMY         Social History     Socioeconomic History    Marital status: Single     Spouse name: Not on file    Number of children: Not on file    Years of education: Not on file    Highest education level: Not on file   Occupational History    Not on file   Tobacco Use    Smoking status: Every Day     Packs/day: 0.50     Years: 15.00     Pack years: 7.50     Types: Cigarettes     Start date: 1/1/2000    Smokeless tobacco: Never    Tobacco comments:     Working to reduce all usage of tobacco and alcohol   Substance and Sexual Activity    Alcohol use:  Yes     Alcohol/week: 9.0 standard drinks     Types: 5 Glasses of wine, 4 Shots of liquor per week     Comment:  a few glasses of wine a week    Drug use: Yes     Types: Marijuana Charmayne Stai)     Comment: 2x month    Sexual activity: Not Currently     Partners: Male   Other Topics Concern    Not on file   Social History Narrative    Not on file     Social Determinants of Health     Financial Resource Strain: Low Risk     Difficulty of Paying Living Expenses: Not hard at all   Food Insecurity: No Food Insecurity    Worried About Running Out of Food in the Last Year: Never true    Ran Out of Food in the Last Year: Never true   Transportation Needs: Not on file   Physical Activity: Insufficiently Active    Days of Exercise per Week: 2 days    Minutes of Exercise per Session: 40 min   Stress: Not on file   Social Connections: Not on file   Intimate Partner Violence: Not At Risk    Fear of Current or Ex-Partner: No    Emotionally Abused: No    Physically Abused: No    Sexually Abused: No   Housing Stability: Not on file       Family History   Problem Relation Age of Onset    Arthritis Mother     Other Mother         Multiple Sclerosis    High Blood Pressure Mother     Cancer Father         pancreas    Cancer Maternal Grandmother         colon     Arthritis Maternal Grandmother     Obesity Brother        Current Outpatient Medications on File Prior to Visit   Medication Sig Dispense Refill    naproxen (NAPROSYN) 500 MG tablet TAKE 1 TABLET BY MOUTH TWICE DAILY WITH MEALS 60 tablet 0    furosemide (LASIX) 20 MG tablet TAKE 1 TABLET BY MOUTH DAILY 30 tablet 0    naproxen-diphenhydramine 220-25 MG TABS Take by mouth      azelastine (ASTELIN) 0.1 % nasal spray 1 spray by Nasal route 2 times daily Use in each nostril as directed      Fluticasone Propionate (FLONASE ALLERGY RELIEF NA) by Nasal route       No current facility-administered medications on file prior to visit. Pertinent items are noted in HPI  Review of systems reviewed from Patient History Form and available in the patient's chart under the Media tab. No change noted. PHYSICAL EXAMINATION:  Mr. Nena Green is a very pleasant 50 y.o.  male who presents today in no acute distress, awake, alert, and oriented. He is well dressed, nourished and  groomed. Patient with normal affect. Height is  6' (1.829 m), weight is (!) 347 lb (157.4 kg), Body mass index is 47.06 kg/m². Resting respiratory rate is 16.      Examination of the gait, showed that the patient walks heel-toe with a non-antalgic gait and no limp. Examination of both knees showing full ROM, right knee with mild crepitus, tenderness on medial joint line, positive Taj test, stable to varus and valgus stress. He has intact sensation and good pedal pulses. He has good strength in 2 planes. Knee reflex 1+ bilaterally. IMAGING:  Xray 3 views of the right knee was obtained 12/14/2022 in the office and reviewed. These demonstrate mild degenerative changes with narrowing of the joint space in the medial joint space compartment, no fracture. IMPRESSION: Right knee pain/ possible medial meniscus tear. PLAN: I discussed with the patient the treatment options including both surgical and non-surgical treatment. We recommended continue Quad exercises and stretching of the calf and hamstrings which was taught to the patient today. He will take NSAIDS Naprosyn. I discussed with the patient that I think that he would really benefit from a course of physical therapy for further strengthening and stretching. Since he is continuing to have significant symptoms, we will obtain an MRI of the right knee in order to further evaluate medial meniscus tear. He understands that this may need surgery if the pain did not to resolve. The patient smokes cigarettes, and we discussed with the patient the risks of smoking on general health and also on bone and soft tissue healing (delay and non-union), and promised to cut down or stop smoking. Smoking: Educated the patient regarding the hazards of smoking and that it harms their body in many ways. It increases the chance of developing heart disease, lung disease, cancer, and other health problems including poor bone and wound healing. The importance of smoking cessation for optimal bone and wound healing was stressed. This was communicated verbally, 5 Minutes.       Ulices Quach MD

## 2023-01-26 ENCOUNTER — TELEPHONE (OUTPATIENT)
Dept: ORTHOPEDIC SURGERY | Age: 53
End: 2023-01-26

## 2023-01-26 ENCOUNTER — HOSPITAL ENCOUNTER (OUTPATIENT)
Dept: PHYSICAL THERAPY | Age: 53
Setting detail: THERAPIES SERIES
Discharge: HOME | End: 2023-01-26
Payer: COMMERCIAL

## 2023-01-26 PROCEDURE — 97016 VASOPNEUMATIC DEVICE THERAPY: CPT | Performed by: CHIROPRACTOR

## 2023-01-26 PROCEDURE — 97110 THERAPEUTIC EXERCISES: CPT | Performed by: CHIROPRACTOR

## 2023-01-26 NOTE — TELEPHONE ENCOUNTER
Attempted to reach patient to let him know that his MRI does not require PA. When patient calls back please provide Scheduling number to him.    Unable to LM due to VM being full

## 2023-01-27 NOTE — FLOWSHEET NOTE
[facility] - Outpatient Rehabilitation and Therapy, [facility]  [address] [facility], [facility]  Phone: [phone]   Fax:     [phone]      Physical Therapy Treatment Note/ Progress Report:     Date:  2023    Patient Name:  Godwin Allred    :  1970  MRN: [id number]    Pertinent Medical History:Additional Pertinent Hx: Arthritis, Morbid Obesity, Depression    Medical/Treatment Diagnosis Information:  Medical Diagnosis: Acute medial meniscus tear of right knee, initial encounter [S83.241A]  Treatment Diagnosis: Decreased Knee strength / ROM    Insurance/Certification information:  PT Insurance Information: UMR  Physician Information:  Alva Wlison MD  Plan of care signed (Y/N): Inbox    Date of Patient follow up with Physician:  no apt made     Progress Report: []  Yes  [x]  No     Date Range for reporting period:  Beginnin2023  Ending:      Progress report due (10 Rx/or 30 days whichever is less):      Recertification due (POC duration/ or 90 days whichever is less):      Visit # POC/Insurance Allowable Auth Needed   10 / 16 60  (80/20) []Yes   [x]No     Latex Allergy:  [x]NO      []YES  Preferred Language for Healthcare:   [x]English       []Other:    Functional Scale:       Date assessed: 23  Test:FOTO  Score:  40 ---> 53    Pain level:   0-1 /10     History of Injury:    Knee pain since a fall ~ 8 yrs ago    SUBJECTIVE:  C/o R knee pain and feeling like his knee wants to [employer] on him  22  reports still swollen, give out,  locking.  -  about the same in terms of locking and giving out. A bit less pain due to light duty  23 overall doing better, especially in mornings. Pain increases as day progresses. 30 min late due to traffic/ roads closed. 23 reports needs to make certain movements to be able to straighten it. Reports pain/ buckling less.    23 - Pain is mostly in R lower leg   1-16-23 reports increased edema and decreased ROM. Does not feel he did anything to aggravate it. Pain the same. F/U  MD 1-25-23.  1/23/23- Reports feeling better since last appt          OBJECTIVE:     1/26/23:   R knee - 0-105, Strength WNL     Height 6'  347#    MRI 2019   CONCLUSION:   1. Complex tear with radial and horizontal components involving the posterior horn and midbody    of the lateral meniscus with the horizontal tear extending into the anterior horn. 2. 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 3. Patella connie with patellar chondromalacia. 4. Small knee joint effusion with synovial thickening involving the suprapatellar recess. 5. Superficial venous varicosities are observed, particularly lateral to the knee joint.            RESTRICTIONS/PRECAUTIONS:     Exercises/Interventions:     Therapeutic Ex (07611)   Min:  40 Reps/Resistance Notes/CUES   Nu-step           #10 L-1 x 5 min          GSS B/ HSS B 30 sec x 2 ea     HR/TR  Fitter    1 thin  X 15 ea B   R     ROM as evan         FAQ   22 #- 2x10   B Manual medial patella glide helped relieve medial discomfort   HS Curl   22# - 2x10    R    Leg press seat  8  70 #  2x10    B          SLR 1# - 2x10   R    SAQ 4# - 2x10   R    Bridge  Clamshell           2 x 10  2 x 10 R    Therapeutic Activity (51480) Min:   5     Step ups 6\" x 10 R                    Gum Drops B balance    Rocker Board F/B, S/S 30 sec  Touch as needed   SLB Touch as needed    weight limit on bosu  300#              NMR re-education (35601)  Min:  CUES NEEDED             Manual Intervention (20057) Min:                               Modalities  Min:     Game Ready  x15                Other Therapeutic Activities: Pt was educated on PT POC, Diagnosis, Prognosis, pathomechanics as well as frequency and duration of scheduling future physical therapy appointments. Time was also taken on this day to answer all patient questions and participation in PT. Reviewed appointment policy in detail with patient and patient verbalized understanding. Home Exercise Program: Patient was instructed in the following for HEP:     . Patient verbalized/demonstrated understanding and was issued written handout. Access Code: [email or web address]  URL: Localist/  Date: 12/28/2022  Prepared by: Amari Woodward    Exercises  Seated Long Arc Quad - 2 x daily - 7 x weekly - 1-2 sets - 10 reps  Short Arc Quad with Ankle Weight - 2 x daily - 7 x weekly - 2 sets - 10 reps - 4# hold  Active Straight Leg Raise with Quad Set - 2 x daily - 7 x weekly - 2 sets - 10 reps - 2# hold  Standing Heel Raise with Support - 2 x daily - 7 x weekly - 1 sets - 10 reps  Standing Ankle Dorsiflexion with Table Support - 2 x daily - 7 x weekly - 1 sets - 10 reps  Gastroc Stretch on Wall - 2 x daily - 7 x weekly - 2 reps - 30 sec hold  Standing Hamstring Stretch with Step - 2 x daily - 7 x weekly - 2 reps - 30 sec hold        Therapeutic Exercise and NMR EXR  [] (81412) Provided verbal/tactile cueing for activities related to strengthening, flexibility, endurance, ROM for improvements in LE, proximal hip, and core control with self care, mobility, lifting, ambulation.  [] (18413) Provided verbal/tactile cueing for activities related to improving balance, coordination, kinesthetic sense, posture, motor skill, proprioception  to assist with LE, proximal hip, and core control in self care, mobility, lifting, ambulation and eccentric single leg control. [address] and Therapeutic Activities:    [] (24540 or 78912) Provided verbal/tactile cueing for activities related to improving balance, coordination, kinesthetic sense, posture, motor skill, proprioception and motor activation to allow for proper function of core, proximal hip and LE with self care and ADLs and functional mobility.    [] (85881) Gait Re-education- Provided training and instruction to the patient for proper LE, core and proximal hip recruitment and positioning and eccentric body weight control with ambulation re-education including up and down stairs     Home Exercise Program:    [x] (12365) Reviewed/Progressed HEP activities related to strengthening, flexibility, endurance, ROM of core, proximal hip and LE for functional self-care, mobility, lifting and ambulation/stair navigation   [] (14772)Reviewed/Progressed HEP activities related to improving balance, coordination, kinesthetic sense, posture, motor skill, proprioception of core, proximal hip and LE for self care, mobility, lifting, and ambulation/stair navigation      Manual Treatments:  PROM / STM / Oscillations-Mobs:  G-I, II, III, IV (PA's, Inf., Post.)  [] (26902) Provided manual therapy to mobilize LE, proximal hip and/or LS spine soft tissue/joints for the purpose of modulating pain, promoting relaxation,  increasing ROM, reducing/eliminating soft tissue swelling/inflammation/restriction, improving soft tissue extensibility and allowing for proper ROM for normal function with self care, mobility, lifting and ambulation. Charges:  Timed Code Treatment Minutes: 30   Total Treatment Minutes: 45      [] EVAL (LOW) 18018 (typically 20 minutes face-to-face)  [] EVAL (MOD) 10663 (typically 30 minutes face-to-face)  [] EVAL (HIGH) 52080 (typically 45 minutes face-to-face)  [] RE-EVAL     [x] MQ(05769) x   2  [] Dry needle 1 or 2 Muscles (70377)  [] NMR (29900) x     [] Dry needle 3+ Muscles (52993)  [] Manual (43022) x     [] Ultrasound (87538) x  [] TA (10867) x     [] Mech Traction (62844)  [] ES(attended) (23247)     [] ES (un) (05083):   [x] Vasopump (11305) [] Ionto (32354)   [] Other:    GOALS:  Patient stated goal: Feeling good support in my knee   [] Progressing: [x] Met: [] Not Met: [] Adjusted    Therapist goals for Patient:   Short Term Goals:  To be achieved in: 2 weeks  1. Independent in HEP and progression per patient tolerance, in order to prevent re-injury. [] Progressing: [x] Met: [] Not Met: [] Adjusted  2. Patient will have a decrease in pain to facilitate improvement in movement, function, and ADLs as indicated by Functional Deficits. [] Progressing: [x] Met: [] Not Met: [] Adjusted    Long Term Goals: To be achieved in: 6 weeks  1. Increase FOTO functional outcome score from 40 to 64 to assist with reaching prior level of function. [x] Progressing: [] Met: [] Not Met: [] Adjusted  2. Patient will demonstrate increased AROM  to allow for proper joint functioning as indicated by patients Functional Deficits. [x] Progressing: [] Met: [] Not Met: [] Adjusted  3. Patient will demonstrate an increase in Strength to demonstrate good gait pattern for proper functional mobility as indicated by patients Functional Deficits. [] Progressing: [x] Met: [] Not Met: [] Adjusted  4. Patient will return to usual functional activities without increased symptoms or restriction. [] Progressing: [x] Met: [] Not Met: [] Adjusted       ASSESSMENT:    12-28-22 tolerated above ex w/o increased complaint. 12/30 -  Notes SLR made him sore - will all weight slowly for those - increased ex.  1-4-23 tolerated above ok  1-9-23 gradually progressing   1/12/23 - States lower leg feels best with gastroc stretches  1-16-23 tolerated above ex, need to decrease FAQ wt due to discomfort. 1/26/23 -  Has improved with decreased pain and increased strength          Treatment/Activity Tolerance:  [x] Patient tolerated treatment well [] Patient limited by fatique  [] Patient limited by pain  [] Patient limited by other medical complications  [] Other:     Overall Progression Towards Functional goals/ Treatment Progress Update:  [x] Patient is progressing as expected towards functional goals listed. [] Progression is slowed due to complexities/Impairments listed.   [] Progression has been slowed due to co-morbidities. [] Plan just implemented, too soon to assess goals progression <30days   [] Goals require adjustment due to lack of progress  [] Patient is not progressing as expected and requires additional follow up with physician  [] Other    Prognosis for POC: [x] Good [] Fair  [] Poor    Patient requires continued skilled intervention: [x] Yes  [] No        PLAN: DC to HEP and return to MD  [] Continue per plan of care [] Alter current plan (see comments)  [] Plan of care initiated [] Hold pending MD visit [x] Discharge    Electronically signed by: Carrie Palomino  GT#[id number]    Note: If patient does not return for scheduled/recommended follow up visits, this note will serve as a discharge from care along with the most recent update on progress.

## 2023-02-09 ENCOUNTER — HOSPITAL ENCOUNTER (OUTPATIENT)
Dept: MRI IMAGING | Age: 53
Discharge: HOME | End: 2023-02-09
Payer: COMMERCIAL

## 2023-02-09 DIAGNOSIS — S83.241A ACUTE MEDIAL MENISCUS TEAR OF RIGHT KNEE, INITIAL ENCOUNTER: ICD-10-CM

## 2023-02-09 DIAGNOSIS — M25.561 RIGHT KNEE PAIN, UNSPECIFIED CHRONICITY: ICD-10-CM

## 2023-02-09 PROCEDURE — 73721 MRI JNT OF LWR EXTRE W/O DYE: CPT

## 2023-03-01 ENCOUNTER — OFFICE VISIT (OUTPATIENT)
Dept: ORTHOPEDIC SURGERY | Age: 53
End: 2023-03-01

## 2023-03-01 VITALS — HEIGHT: 72 IN | WEIGHT: 315 LBS | BODY MASS INDEX: 42.66 KG/M2

## 2023-03-01 DIAGNOSIS — S83.241A ACUTE MEDIAL MENISCUS TEAR OF RIGHT KNEE, INITIAL ENCOUNTER: Primary | ICD-10-CM

## 2023-03-01 DIAGNOSIS — F17.200 CURRENT SMOKER: ICD-10-CM

## 2023-03-01 NOTE — PROGRESS NOTES
CHIEF COMPLAINT: Right knee pain/ possible medial meniscus tear. HISTORY:  Mr. Merritt Gonzalez 50 y.o.  male presents today for f/u to review his MRI. He continues to complain of pain even after PT and still has locking and catching symptoms with his knee buckling. He was initially seen on 12/14/2022 as a consultation request from CHELSEA Mario CNP for evaluation of right knee pain which started 8 ears ago when he fell backward and bent his leg. He is complaining of sharp pain, 3-7/10. Pain is increase with standing and walking and decrease with rest. Pain is sharp early in the morning with first few steps, dull achy pain by the end of the day. Alleviating factors: rest. No radiation and no numbness and tingling sensation. No other complaint. There is a h/o catching and locking. No h/o gout. Past Medical History:   Diagnosis Date    Arthritis        Past Surgical History:   Procedure Laterality Date    APPENDECTOMY         Social History     Socioeconomic History    Marital status: Single     Spouse name: Not on file    Number of children: Not on file    Years of education: Not on file    Highest education level: Not on file   Occupational History    Not on file   Tobacco Use    Smoking status: Every Day     Packs/day: 0.50     Years: 15.00     Pack years: 7.50     Types: Cigarettes     Start date: 1/1/2000    Smokeless tobacco: Never    Tobacco comments:     Working to reduce all usage of tobacco and alcohol   Substance and Sexual Activity    Alcohol use:  Yes     Alcohol/week: 9.0 standard drinks     Types: 5 Glasses of wine, 4 Shots of liquor per week     Comment:  a few glasses of wine a week    Drug use: Yes     Types: Marijuana Kristina Marathon)     Comment: 2x month    Sexual activity: Not Currently     Partners: Male   Other Topics Concern    Not on file   Social History Narrative    Not on file     Social Determinants of Health     Financial Resource Strain: Low Risk     Difficulty of Paying Living Expenses: Not hard at all   Food Insecurity: No Food Insecurity    Worried About Running Out of Food in the Last Year: Never true    Ran Out of Food in the Last Year: Never true   Transportation Needs: Not on file   Physical Activity: Insufficiently Active    Days of Exercise per Week: 2 days    Minutes of Exercise per Session: 40 min   Stress: Not on file   Social Connections: Not on file   Intimate Partner Violence: Not At Risk    Fear of Current or Ex-Partner: No    Emotionally Abused: No    Physically Abused: No    Sexually Abused: No   Housing Stability: Not on file       Family History   Problem Relation Age of Onset    Arthritis Mother     Other Mother         Multiple Sclerosis    High Blood Pressure Mother     Cancer Father         pancreas    Cancer Maternal Grandmother         colon     Arthritis Maternal Grandmother     Obesity Brother        Current Outpatient Medications on File Prior to Visit   Medication Sig Dispense Refill    naproxen (NAPROSYN) 500 MG tablet TAKE 1 TABLET BY MOUTH TWICE DAILY WITH MEALS 60 tablet 0    furosemide (LASIX) 20 MG tablet TAKE 1 TABLET BY MOUTH DAILY 30 tablet 0    naproxen-diphenhydramine 220-25 MG TABS Take by mouth      azelastine (ASTELIN) 0.1 % nasal spray 1 spray by Nasal route 2 times daily Use in each nostril as directed      Fluticasone Propionate (FLONASE ALLERGY RELIEF NA) by Nasal route       No current facility-administered medications on file prior to visit. Pertinent items are noted in HPI  Review of systems reviewed from Patient History Form and available in the patient's chart under the Media tab. No change noted. PHYSICAL EXAMINATION:  Mr. Dat Cline is a very pleasant 50 y.o.  male who presents today in no acute distress, awake, alert, and oriented. He is well dressed, nourished and  groomed. Patient with normal affect. Height is  6' (1.829 m), weight is (!) 347 lb (157.4 kg), Body mass index is 47.06 kg/m². Resting respiratory rate is 16. Examination of the gait, showed that the patient walks heel-toe with a non-antalgic gait and no limp. Examination of both knees showing full ROM, right knee with mild crepitus, tenderness on medial joint line, positive Taj test, stable to varus and valgus stress. He has intact sensation and good pedal pulses. He has good strength in 2 planes. Knee reflex 1+ bilaterally. IMAGING:  Xray 3 views of the right knee was obtained 12/14/2022 in the office and reviewed. These demonstrate mild degenerative changes with narrowing of the joint space in the medial joint space compartment, no fracture. MRI of the right knee dated 2/9/2023 was reviewed and was consistent with medial and lateral meniscus tear, quadriceps tendinosis and tricompartmental DJD. IMPRESSION: Right knee pain/lateral and medial meniscus tear, quadriceps tendinosis and tricompartmental DJD. PLAN: I discussed with the patient the treatment options including both surgical and non-surgical treatment and discussed MRI results at length. We recommended continue Quad exercises and stretching of the calf and hamstrings which was retaught to the patient today. He will take NSAIDS Naprosyn, Rx sent and instructed in care. Recommend referral to Dr. Geraldo Masterson for further management and surgical options. He understands that this may need surgery if the pain did not to resolve. The patient smokes cigarettes, and we discussed with the patient the risks of smoking on general health and also on bone and soft tissue healing (delay and non-union), and promised to cut down or stop smoking. Smoking: Educated the patient regarding the hazards of smoking and that it harms their body in many ways. It increases the chance of developing heart disease, lung disease, cancer, and other health problems including poor bone and wound healing.     The importance of smoking cessation for optimal bone and wound healing was stressed. This was communicated verbally, 5 Minutes.       Lauryn Beavers MD

## 2023-03-07 ENCOUNTER — OFFICE VISIT (OUTPATIENT)
Dept: ORTHOPEDIC SURGERY | Age: 53
End: 2023-03-07
Payer: COMMERCIAL

## 2023-03-07 VITALS — HEIGHT: 72 IN | WEIGHT: 315 LBS | RESPIRATION RATE: 16 BRPM | BODY MASS INDEX: 42.66 KG/M2

## 2023-03-07 DIAGNOSIS — S83.271A COMPLEX TEAR OF LATERAL MENISCUS OF RIGHT KNEE AS CURRENT INJURY, INITIAL ENCOUNTER: Primary | ICD-10-CM

## 2023-03-07 DIAGNOSIS — S83.241A OTHER TEAR OF MEDIAL MENISCUS OF RIGHT KNEE AS CURRENT INJURY, INITIAL ENCOUNTER: ICD-10-CM

## 2023-03-07 PROCEDURE — 99203 OFFICE O/P NEW LOW 30 MIN: CPT | Performed by: ORTHOPAEDIC SURGERY

## 2023-03-07 SDOH — HEALTH STABILITY: PHYSICAL HEALTH: ON AVERAGE, HOW MANY DAYS PER WEEK DO YOU ENGAGE IN MODERATE TO STRENUOUS EXERCISE (LIKE A BRISK WALK)?: 3 DAYS

## 2023-03-07 SDOH — HEALTH STABILITY: PHYSICAL HEALTH: ON AVERAGE, HOW MANY MINUTES DO YOU ENGAGE IN EXERCISE AT THIS LEVEL?: 30 MIN

## 2023-03-07 NOTE — PROGRESS NOTES
[address] New Patient Additional Questions       Question 3/7/2023 12:23 PM EST - Filed by Patient    Please list any providers you have seen in the last 12 months and for what reason       In the past 12 Months have you had any of the following symptoms? Headaches No    Fainting or passing out No    Sudden loss of vision, strength, or inability to speak No    Hearing loss or ringing in ear(s) No    Nosebleeds No    Coughing for more than 2-4 weeks No    Coughing up blood       Shortness of breath or wheezing       Swelling of feet or ankles Yes    Chest pain, chest pressure or heaviness No    Irregular heartbeat or suden fast heartbeat No    Difficulty swallowing or food \"sticking\" No    Frequent heartburn or indigestion No    Abdominal pain No    Frequent constipation No    Frequent diarrhea No    Rectal bleeding/black stools No    Urinating more than twice per night No    Pain in joints or bones No    Unusual bruising or bleeding Yes    Seizures, convulsions No    Change in wart, mole or skin growth Yes    Difficulty sleeping No    Tearfulness No    Difficulty concentrating No    Weight loss more than 5-10 pounds No    Irregular menstrual bleeding No    Menstrual bleeding after menopause No    Breast lumps/discharge from nipple(s) No    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 Yes    Within the last year, have you been afraid of your partner or ex-partner? No    Within the last year, have you been humiliated or emotionally abused in other ways by your partner or ex-partner? No    Within the last year, have you been kicked, hit, slapped, or otherwise physically hurt by your partner or ex-partner? No    Within the last year, have you been raped or forced to have any kind of sexual activity by your partner or ex-partner? No    On average, how many days per week do you engage in moderate to strenuous exercise (like a brisk walk)?  3 days    On average, how many minutes do you engage in exercise at this level? 30 min    Which option best describes your E-cigarette/Vaping usage?  Never Used

## 2023-03-07 NOTE — PROGRESS NOTES
CHIEF COMPLAINT: Right knee pain. History:   Sheela William is a 50 y.o. male referred by Jamilah Watters MD for Sports Medicine consultation for evaluation and treatment of right knee pain / injury. The patient complains of right knee pain. This is evaluated as a personal injury. The pain began 8 years ago. Rate pain 3-6/10. There was a history of injury. He states he was carrying musical equipment down some stairs and missed a step and fell backwards and bent his knee. He had pain off and on since that time. However, he was also getting out of a car and twisted his knee and felt a pop in December. The pain is located anterior. He complains of anteromedial tibial pain. The knee has not given out or felt unstable. The patient can bend and straighten the knee fully. There is swelling in the knee. There was catching / locking of the knee. The patient has had PT. The patient has not had an injection. The patient has taken NSAIDs. The patient has tried ice. The patient's occupation is . He did previously seen Dr. Eve Feliciano in 2019 for the right knee also. Past Medical History:   Diagnosis Date    Arthritis        Past Surgical History:   Procedure Laterality Date    APPENDECTOMY         Family History   Problem Relation Age of Onset    Arthritis Mother     Other Mother         Multiple Sclerosis    High Blood Pressure Mother     Cancer Father         pancreas    Cancer Maternal Grandmother         colon     Arthritis Maternal Grandmother     Obesity Brother        Social History     Socioeconomic History    Marital status: Single   Tobacco Use    Smoking status: Every Day     Packs/day: 0.50     Years: 15.00     Pack years: 7.50     Types: Cigarettes     Start date: 1/1/2000    Smokeless tobacco: Never    Tobacco comments:     Working to reduce all usage of tobacco and alcohol   Substance and Sexual Activity    Alcohol use:  Yes     Alcohol/week: 9.0 standard drinks     Types: 5 Glasses of wine, 4 Shots of liquor per week     Comment:  a few glasses of wine a week    Drug use: Yes     Types: Marijuana Guth[place]uj[place] Kenee)     Comment: 2x month    Sexual activity: Not Currently     Partners: Male     Social Determinants of Health     Financial Resource Strain: Low Risk     Difficulty of Paying Living Expenses: Not hard at all   Food Insecurity: No Food Insecurity    Worried About Running Out of Food in the Last Year: Never true    Ran Out of Food in the Last Year: Never true   Physical Activity: Insufficiently Active    Days of Exercise per Week: 3 days    Minutes of Exercise per Session: 30 min   Intimate Partner Violence: Not At Risk    Fear of Current or Ex-Partner: No    Emotionally Abused: No    Physically Abused: No    Sexually Abused: No       Current Outpatient Medications   Medication Sig Dispense Refill    naproxen (NAPROSYN) 500 MG tablet TAKE 1 TABLET BY MOUTH TWICE DAILY WITH MEALS 60 tablet 0    furosemide (LASIX) 20 MG tablet TAKE 1 TABLET BY MOUTH DAILY 30 tablet 0    naproxen-diphenhydramine 220-25 MG TABS Take by mouth      azelastine (ASTELIN) 0.1 % nasal spray 1 spray by Nasal route 2 times daily Use in each nostril as directed      Fluticasone Propionate (FLONASE ALLERGY RELIEF NA) by Nasal route       No current facility-administered medications for this visit. Allergies   Allergen Reactions    Other Other (See Comments)     cats           Physical Examination:     Vital signs:   Resp 16   Ht 6' (1.829 m)   Wt (!) 363 lb (164.7 kg)   BMI 49.23 kg/m²     General:  alert, appears stated age, cooperative, and no distress   Gait:  Normal. The patient can bear weight on the injured extremity.      Right Knee  Alignment:  neutral   ROM:  0 degrees extension to 120 degrees flexion   Bilateral knees   Crepitus:  no   Joint Tenderness:  medial joint line   Effusion:   20-30 cc   Patellar excursion:  2 of 4 quadrants    Patellar tilt test:  positive   Patellar facet tenderness: negative medial   negative lateral   Patellar apprehension test:  negative   Lachman test:  negative   Left knee: negative   Anterior drawer test:  negative   Left knee: negative   Posterior drawer:   negative    Left knee: negative   Varus laxity at 30 degrees:  negative   Left knee: negative   Valgus laxity at 30 degrees:   negative   Left knee: negative   Taj's test:  Positive medial   Left knee: negative   He does have some hypersensitivity/tenderness to palpation of the soft tissue into the anterior medial proximal tibia    There is not any cellulitis, lymphedema or cutaneous lesions noted in the lower extremities. Motor exam of the lower extremities show quadriceps, hamstrings, foot dorsiflexion and plantarflexion grossly intact. Sensation to both feet is grossly intact to light touch. The bilateral lower extremities are warm and well-perfused with brisk capillary refill. Imaging:  Right Knee X-Ray: reviewed. Maintained joint spaces. Right Knee MRI: I independently reviewed the images, as well as the radiology report. Complex lateral meniscus tear. Subtle horizontal undersurface tear of the medial meniscus posterior horn. Partial tearing of the distal insertional quadriceps tendon. Tricompartmental osteoarthrosis, with areas of cartilage loss as described   above. There is a 6 x 3 mm intra-articular body. Small knee joint effusion. MRI right knee 2019:  1. Complex tear with radial and horizontal components involving the posterior horn and midbody    of the lateral meniscus with the horizontal tear extending into the anterior horn. 2. 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 3. Patella connie with patellar chondromalacia. 4. Small knee joint effusion with synovial thickening involving the suprapatellar recess.    5. Superficial venous varicosities are observed, particularly lateral to the knee joint. Assessment:     Right knee medial lateral tear  Right knee medial meniscus tear  Morbid obesity      Plan:     Natural history and expected course discussed. Questions answered. We reviewed the MRI images and discussed the findings. I did review his prior office notes from Dr. Giovani Bustillos visits. He did have prior complex lateral meniscus tear and loose body in 2019. He does have medial joint line tenderness and now possible small medial meniscus tear. We discussed treatment options, including continued nonoperative treatment and knee arthroscopy. I did extensively discussed with him that knee arthroscopy may not resolve all his symptoms. We stated that knee arthroscopy would not resolve his chondromalacia. He would like to think about this and will call us if he would like to schedule surgery. Plan for surgery would be right knee arthroscopy, partial medial and lateral meniscectomy. Scott Shell. Joannie Olszewski, MD  Orthopaedic Surgery and Sports Medicine     Disclaimer: This note was generated with use of a verbal recognition program and an attempt was made to check for errors. It is possible that there are still dictated errors within this office note. If so, please bring any significant errors to my attention for an addendum. All efforts were made to ensure that this office note is accurate.

## 2025-05-16 ENCOUNTER — OFFICE VISIT (OUTPATIENT)
Dept: FAMILY MEDICINE CLINIC | Age: 55
End: 2025-05-16
Payer: COMMERCIAL

## 2025-05-16 VITALS
HEART RATE: 109 BPM | OXYGEN SATURATION: 96 % | DIASTOLIC BLOOD PRESSURE: 80 MMHG | SYSTOLIC BLOOD PRESSURE: 130 MMHG | HEIGHT: 72 IN | BODY MASS INDEX: 42.66 KG/M2 | TEMPERATURE: 99.1 F | WEIGHT: 315 LBS

## 2025-05-16 DIAGNOSIS — E78.2 MIXED HYPERLIPIDEMIA: ICD-10-CM

## 2025-05-16 DIAGNOSIS — Z12.5 SCREENING FOR MALIGNANT NEOPLASM OF PROSTATE: ICD-10-CM

## 2025-05-16 DIAGNOSIS — J40 BRONCHITIS: Primary | ICD-10-CM

## 2025-05-16 DIAGNOSIS — R73.03 PREDIABETES: ICD-10-CM

## 2025-05-16 PROCEDURE — 99213 OFFICE O/P EST LOW 20 MIN: CPT

## 2025-05-16 RX ORDER — DEXTROMETHORPHAN HYDROBROMIDE AND PROMETHAZINE HYDROCHLORIDE 15; 6.25 MG/5ML; MG/5ML
2.5-5 SYRUP ORAL 4 TIMES DAILY PRN
Qty: 240 ML | Refills: 0 | Status: SHIPPED | OUTPATIENT
Start: 2025-05-16 | End: 2025-05-23

## 2025-05-16 RX ORDER — METHYLPREDNISOLONE 4 MG/1
TABLET ORAL
Qty: 1 KIT | Refills: 0 | Status: SHIPPED | OUTPATIENT
Start: 2025-05-16 | End: 2025-05-22

## 2025-05-16 SDOH — ECONOMIC STABILITY: FOOD INSECURITY: WITHIN THE PAST 12 MONTHS, THE FOOD YOU BOUGHT JUST DIDN'T LAST AND YOU DIDN'T HAVE MONEY TO GET MORE.: NEVER TRUE

## 2025-05-16 SDOH — ECONOMIC STABILITY: FOOD INSECURITY: WITHIN THE PAST 12 MONTHS, YOU WORRIED THAT YOUR FOOD WOULD RUN OUT BEFORE YOU GOT MONEY TO BUY MORE.: NEVER TRUE

## 2025-05-16 SDOH — HEALTH STABILITY: PHYSICAL HEALTH: ON AVERAGE, HOW MANY DAYS PER WEEK DO YOU ENGAGE IN MODERATE TO STRENUOUS EXERCISE (LIKE A BRISK WALK)?: 5 DAYS

## 2025-05-16 SDOH — HEALTH STABILITY: PHYSICAL HEALTH: ON AVERAGE, HOW MANY MINUTES DO YOU ENGAGE IN EXERCISE AT THIS LEVEL?: 10 MIN

## 2025-05-16 ASSESSMENT — PATIENT HEALTH QUESTIONNAIRE - PHQ9
SUM OF ALL RESPONSES TO PHQ QUESTIONS 1-9: 0
1. LITTLE INTEREST OR PLEASURE IN DOING THINGS: NOT AT ALL
SUM OF ALL RESPONSES TO PHQ QUESTIONS 1-9: 0
2. FEELING DOWN, DEPRESSED OR HOPELESS: NOT AT ALL

## 2025-05-16 ASSESSMENT — ENCOUNTER SYMPTOMS
EYE DISCHARGE: 0
SINUS PRESSURE: 0
ABDOMINAL PAIN: 0
CHEST TIGHTNESS: 0
COUGH: 1
RHINORRHEA: 1
WHEEZING: 1
EYE ITCHING: 0
CONSTIPATION: 0
TROUBLE SWALLOWING: 0
EYE REDNESS: 0
SINUS PAIN: 0
SORE THROAT: 0
DIARRHEA: 0
VOMITING: 0
SHORTNESS OF BREATH: 1

## 2025-05-16 NOTE — PROGRESS NOTES
Eyes:  Negative for discharge, redness, itching and visual disturbance.   Respiratory:  Positive for cough, shortness of breath and wheezing. Negative for chest tightness.    Cardiovascular:  Negative for chest pain and palpitations.   Gastrointestinal:  Negative for abdominal pain, constipation, diarrhea and vomiting.   Musculoskeletal:  Negative for myalgias.   Skin:  Negative for pallor and rash.   Allergic/Immunologic: Negative for environmental allergies.   Neurological:  Positive for headaches. Negative for dizziness and light-headedness.   Psychiatric/Behavioral:  Negative for confusion, dysphoric mood and sleep disturbance. The patient is not nervous/anxious.                Objective     /80   Pulse (!) 109   Temp 99.1 °F (37.3 °C) (Oral)   Ht 1.829 m (6')   Wt (!) 184.2 kg (406 lb)   SpO2 96%   BMI 55.06 kg/m²      Physical Exam  Vitals and nursing note reviewed.   Constitutional:       General: He is not in acute distress.     Appearance: Normal appearance. He is morbidly obese. He is ill-appearing. He is not diaphoretic.   HENT:      Head: Normocephalic and atraumatic.      Right Ear: Tympanic membrane, ear canal and external ear normal. There is no impacted cerumen.      Left Ear: Tympanic membrane, ear canal and external ear normal. There is no impacted cerumen.      Nose: Nose normal. No congestion or rhinorrhea.      Mouth/Throat:      Mouth: Mucous membranes are moist.      Pharynx: Oropharynx is clear. Postnasal drip present. No oropharyngeal exudate or posterior oropharyngeal erythema.   Eyes:      General: No scleral icterus.        Right eye: No discharge.         Left eye: No discharge.      Extraocular Movements: Extraocular movements intact.      Conjunctiva/sclera: Conjunctivae normal.      Pupils: Pupils are equal, round, and reactive to light.   Cardiovascular:      Rate and Rhythm: Normal rate and regular rhythm.      Pulses: Normal pulses.      Heart sounds: Normal heart

## 2025-06-13 ENCOUNTER — TELEMEDICINE (OUTPATIENT)
Dept: FAMILY MEDICINE CLINIC | Age: 55
End: 2025-06-13

## 2025-06-13 DIAGNOSIS — J40 BRONCHITIS: Primary | ICD-10-CM

## 2025-06-13 RX ORDER — DEXTROMETHORPHAN HYDROBROMIDE AND PROMETHAZINE HYDROCHLORIDE 15; 6.25 MG/5ML; MG/5ML
2.5-5 SYRUP ORAL 4 TIMES DAILY PRN
Qty: 240 ML | Refills: 0 | Status: SHIPPED | OUTPATIENT
Start: 2025-06-13 | End: 2025-06-20

## 2025-06-13 RX ORDER — AZITHROMYCIN 250 MG/1
TABLET, FILM COATED ORAL
Qty: 6 TABLET | Refills: 0 | Status: SHIPPED | OUTPATIENT
Start: 2025-06-13 | End: 2025-06-23

## 2025-06-13 RX ORDER — METHYLPREDNISOLONE 4 MG/1
TABLET ORAL
Qty: 1 KIT | Refills: 0 | Status: SHIPPED | OUTPATIENT
Start: 2025-06-13 | End: 2025-06-19

## 2025-06-13 ASSESSMENT — ENCOUNTER SYMPTOMS
WHEEZING: 1
TROUBLE SWALLOWING: 0
SINUS PAIN: 0
EYE DISCHARGE: 0
SINUS PRESSURE: 0
ABDOMINAL PAIN: 0
CHEST TIGHTNESS: 0
COUGH: 1
SHORTNESS OF BREATH: 1
CONSTIPATION: 0
EYE REDNESS: 0
RHINORRHEA: 1
SORE THROAT: 0
EYE ITCHING: 0
DIARRHEA: 0
VOMITING: 0

## 2025-06-13 NOTE — PROGRESS NOTES
Estuardo Mcdonald, was evaluated through a synchronous (real-time) audio-video encounter. The patient (or guardian if applicable) is aware that this is a billable service, which includes applicable co-pays. This Virtual Visit was conducted with patient's (and/or legal guardian's) consent. Patient identification was verified, and a caregiver was present when appropriate.   The patient was located at Home: [address] [place], [place] [id number]  Provider was located at Facility (Appt Dept): [address] [place] [address]  [address]  [place] [phone]  Confirm you are appropriately licensed, registered, or certified to deliver care in the state where the patient is located as indicated above. If you are not or unsure, please re-schedule the visit: Yes, I confirm.     Estuardo Mcdonald (:  1970) is a Established patient, presenting virtually for evaluation of the following:      Below is the assessment and plan developed based on review of pertinent history, physical exam, labs, studies, and medications.     Assessment & Plan  Bronchitis   Acute condition, recurrent, Supportive care with appropriate antipyretics and fluids.  CXR.  - Recurrent bronchitis, not fully resolved  -Treatment options discussed.  Z-Barrett, Dosepak, and Phenergan DM being sent to the pharmacy.   The medication uses and side effects were discussed with the patient.  Patient verbalized understanding and agrees to the plan.  - Informed no more Medrol Dosepak after this 1, will need to manage with OTCs or inhalers  -CXR placed, can get if no improvement  -Emphasis on rest and hydration  -Follow-up in office if no improvement  Orders:    methylPREDNISolone (MEDROL DOSEPACK) 4 MG tablet; Take by mouth.    azithromycin (ZITHROMAX) 250 MG tablet; 500mg on day 1 followed by 250mg on days 2 - 5    promethazine-dextromethorphan (PROMETHAZINE-DM) 6.25-15 MG/5ML syrup; Take 2.5-5 mLs by mouth 4 times daily as needed for Cough    XR CHEST STANDARD (2 VW);

## 2025-07-21 DIAGNOSIS — E78.2 MIXED HYPERLIPIDEMIA: ICD-10-CM

## 2025-07-21 DIAGNOSIS — Z12.5 SCREENING FOR MALIGNANT NEOPLASM OF PROSTATE: ICD-10-CM

## 2025-07-21 DIAGNOSIS — R73.03 PREDIABETES: ICD-10-CM

## 2025-07-21 LAB
ALBUMIN SERPL-MCNC: 3.9 G/DL (ref 3.4–5)
ALBUMIN/GLOB SERPL: 1.6 {RATIO} (ref 1.1–2.2)
ALP SERPL-CCNC: 85 U/L (ref 40–129)
ALT SERPL-CCNC: 23 U/L (ref 10–40)
ANION GAP SERPL CALCULATED.3IONS-SCNC: 12 MMOL/L (ref 3–16)
AST SERPL-CCNC: 21 U/L (ref 15–37)
BASOPHILS # BLD: 0 K/UL (ref 0–0.2)
BASOPHILS NFR BLD: 0.4 %
BILIRUB SERPL-MCNC: 0.4 MG/DL (ref 0–1)
BUN SERPL-MCNC: 12 MG/DL (ref 7–20)
CALCIUM SERPL-MCNC: 9.4 MG/DL (ref 8.3–10.6)
CHLORIDE SERPL-SCNC: 103 MMOL/L (ref 99–110)
CHOLEST SERPL-MCNC: 167 MG/DL (ref 0–199)
CO2 SERPL-SCNC: 24 MMOL/L (ref 21–32)
CREAT SERPL-MCNC: 1.2 MG/DL (ref 0.9–1.3)
DEPRECATED RDW RBC AUTO: 15.5 % (ref 12.4–15.4)
EOSINOPHIL # BLD: 0.1 K/UL (ref 0–0.6)
EOSINOPHIL NFR BLD: 1.5 %
EST. AVERAGE GLUCOSE BLD GHB EST-MCNC: 128.4 MG/DL
GFR SERPLBLD CREATININE-BSD FMLA CKD-EPI: 72 ML/MIN/{1.73_M2}
GLUCOSE SERPL-MCNC: 114 MG/DL (ref 70–99)
HBA1C MFR BLD: 6.1 %
HCT VFR BLD AUTO: 43.3 % (ref 40.5–52.5)
HDLC SERPL-MCNC: 42 MG/DL (ref 40–60)
HGB BLD-MCNC: 14 G/DL (ref 13.5–17.5)
LDLC SERPL CALC-MCNC: 97 MG/DL
LYMPHOCYTES # BLD: 2 K/UL (ref 1–5.1)
LYMPHOCYTES NFR BLD: 27.9 %
MCH RBC QN AUTO: 31.4 PG (ref 26–34)
MCHC RBC AUTO-ENTMCNC: 32.3 G/DL (ref 31–36)
MCV RBC AUTO: 97.1 FL (ref 80–100)
MONOCYTES # BLD: 0.7 K/UL (ref 0–1.3)
MONOCYTES NFR BLD: 9 %
NEUTROPHILS # BLD: 4.5 K/UL (ref 1.7–7.7)
NEUTROPHILS NFR BLD: 61.2 %
PLATELET # BLD AUTO: 238 K/UL (ref 135–450)
PMV BLD AUTO: 9.2 FL (ref 5–10.5)
POTASSIUM SERPL-SCNC: 4.6 MMOL/L (ref 3.5–5.1)
PROT SERPL-MCNC: 6.4 G/DL (ref 6.4–8.2)
PSA SERPL DL<=0.01 NG/ML-MCNC: 1.09 NG/ML (ref 0–4)
RBC # BLD AUTO: 4.46 M/UL (ref 4.2–5.9)
SODIUM SERPL-SCNC: 139 MMOL/L (ref 136–145)
TRIGL SERPL-MCNC: 140 MG/DL (ref 0–150)
VLDLC SERPL CALC-MCNC: 28 MG/DL
WBC # BLD AUTO: 7.3 K/UL (ref 4–11)